# Patient Record
Sex: FEMALE | Race: WHITE | NOT HISPANIC OR LATINO | Employment: STUDENT | URBAN - METROPOLITAN AREA
[De-identification: names, ages, dates, MRNs, and addresses within clinical notes are randomized per-mention and may not be internally consistent; named-entity substitution may affect disease eponyms.]

---
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
## 2023-01-12 DIAGNOSIS — D50.8 OTHER IRON DEFICIENCY ANEMIA: ICD-10-CM

## 2023-01-12 DIAGNOSIS — E03.9 ACQUIRED HYPOTHYROIDISM: ICD-10-CM

## 2023-01-12 RX ORDER — FERROUS SULFATE TAB EC 324 MG (65 MG FE EQUIVALENT) 324 (65 FE) MG
TABLET DELAYED RESPONSE ORAL
Qty: 60 TABLET | Refills: 2 | Status: SHIPPED | OUTPATIENT
Start: 2023-01-12

## 2023-01-12 RX ORDER — LEVOTHYROXINE SODIUM 0.05 MG/1
50 TABLET ORAL
Qty: 30 TABLET | Refills: 2 | Status: SHIPPED | OUTPATIENT
Start: 2023-01-12

## 2023-01-12 NOTE — TELEPHONE ENCOUNTER
Pharmacy requesting refill on ferrous sulfate and levothyroxine   Last seen 8/12/22  Medications sent to pharmacy

## 2023-03-22 ENCOUNTER — OFFICE VISIT (OUTPATIENT)
Dept: OBGYN CLINIC | Facility: CLINIC | Age: 18
End: 2023-03-22

## 2023-03-22 VITALS
DIASTOLIC BLOOD PRESSURE: 80 MMHG | WEIGHT: 201.8 LBS | SYSTOLIC BLOOD PRESSURE: 120 MMHG | BODY MASS INDEX: 35.75 KG/M2 | HEIGHT: 63 IN

## 2023-03-22 DIAGNOSIS — N93.9 ABNORMAL UTERINE BLEEDING (AUB): ICD-10-CM

## 2023-03-22 DIAGNOSIS — D50.8 OTHER IRON DEFICIENCY ANEMIA: ICD-10-CM

## 2023-03-22 DIAGNOSIS — E03.9 HYPOTHYROIDISM (ACQUIRED): ICD-10-CM

## 2023-03-22 DIAGNOSIS — N92.1 MENORRHAGIA WITH IRREGULAR CYCLE: Primary | ICD-10-CM

## 2023-03-22 DIAGNOSIS — L68.0 HIRSUTISM: ICD-10-CM

## 2023-03-22 LAB — SL AMB POCT URINE HCG: NORMAL

## 2023-03-22 RX ORDER — NORGESTIMATE AND ETHINYL ESTRADIOL 7DAYSX3 LO
1 KIT ORAL DAILY
Qty: 84 TABLET | Refills: 1 | Status: SHIPPED | OUTPATIENT
Start: 2023-03-22

## 2023-03-22 RX ORDER — MEDROXYPROGESTERONE ACETATE 10 MG/1
10 TABLET ORAL DAILY
Qty: 10 TABLET | Refills: 0 | Status: SHIPPED | OUTPATIENT
Start: 2023-03-22

## 2023-03-22 NOTE — PATIENT INSTRUCTIONS
Have lab work drawn  FASTING  Schedule pelvic ultrasound and have it done  Take 10 days of medroxyprogesterone-- one daily  After finishing medroxyprogesterone, start birth control pill  One every day:   Take it the same time daily, within the same hour time frame (such as between 8 and 9am)  It common to experience some irregular bleeding when newly starting the pill  This should resolve after 3 months of use  Also minor side effects such as breast tenderness, minor headaches and nausea may occur, but typically resolve after continuing on the pill for at least 3 months  Call if you experience severe headaches, visual disturbances, chest pain, shortness of breath, abdominal pain, pain tingling or weakness in arms or legs  Return visit in 4 months for pill & blood pressure check  Polycystic Ovarian Syndrome   AMBULATORY CARE:   Polycystic ovarian syndrome (PCOS)  is a group of symptoms caused by a hormone disorder  Your body produces too many hormones and your ovaries do not work correctly  Your ovaries have fluid-filled sacs with an immature egg in each one  These are called follicles  The follicles grow bigger and make your ovaries look like they have cysts in them  PCOS increases your risk for endometrial cancer and infertility  Common signs and symptoms:   Irregular or absent monthly periods    Extreme hair growth on your face, chest, and back    Acne, thinning hair or baldness on your scalp    Weight gain, especially around your abdomen    High blood sugar levels or high blood pressure    Periods of extreme sadness and extreme happiness    Difficulty getting pregnant    Darkening of the skin around your neck creases, groin, and under your breasts    Skin tags in your armpits, or on our neck    Call your doctor or gynecologist if:   Your symptoms get worse  You have questions or concerns about your condition or care      Treatment for PCOS  may include any of the following:  Medicines  may be given to control your blood sugar  You may need medicines to decrease male hormones, and increase female hormones  These medicines may also improve acne, baldness and hair growth you do not want  You may also need medicine to help you ovulate if you want to get pregnant  Lifestyle changes:      Manage other health conditions  PCOS increases your risk for diabetes, heart disease, and high blood pressure  Your healthcare provider may send you to specialists that teach you how to manage these conditions  Maintain a healthy weight  Ask your healthcare provider how much you should weigh  Ask him or her to help you create a weight loss plan if you are overweight  Weight loss can help reduce the symptoms of PCOS  You and your healthcare provider can set manageable weight loss goals  Exercise as directed  Exercise can help keep your blood sugar level steady, lower your risk for heart disease, and help you lose weight  Exercise for at least 150 minutes every week  Spread this amount of exercise over at least 3 days in the week  Do not skip exercise more than 2 days in a row  Include muscle strengthening activities 2 to 3 days each week  Examples of exercise include walking or swimming  Do not sit for longer than 30 minutes at a time  Work with your healthcare provider to create an exercise plan that is right for you  Eat a variety of healthy foods  Healthy foods include fruits, vegetables, whole-grain breads, low-fat dairy products, beans, lean meats, and fish  A dietitian may help you plan meals to help manage your other health conditions  Follow up with your doctor or gynecologist as directed: You may need to return for more tests or to check if the treatment is working  Write down your questions so you remember to ask them during your visits    © Copyright [employer] 2022 Information is for End User's use only and may not be sold, redistributed or otherwise used for commercial purposes  The above information is an  only  It is not intended as medical advice for individual conditions or treatments  Talk to your doctor, nurse or pharmacist before following any medical regimen to see if it is safe and effective for you

## 2023-03-22 NOTE — PROGRESS NOTES
Assessment/Plan:    1  Menorrhagia with irregular cycle  UPT negative  Discussed nature of her irregular bleeding patterns and my suspicion for PCOS  Reviewed rationale for lab work and US for support of the diagnosis  Discussed necessity to protect her endometrial lining due to risk of endometrial hyperplasia, atypia or malignancy  - DHEA-sulfate; Future  - Estradiol; Future  - Follicle stimulating hormone; Future  - Insulin, fasting; Future  - Comprehensive metabolic panel; Future  - Luteinizing hormone; Future  - Prolactin; Future  - Testosterone, free, total; Future  - Lipid panel; Future  - TSH, 3rd generation with Free T4 reflex; Future  - CBC and differential; Future  - US pelvis complete w transvaginal; Future  - 17-Hydroxyprogesterone; Future  - medroxyPROGESTERone (PROVERA) 10 mg tablet; Take 1 tablet (10 mg total) by mouth daily  Dispense: 10 tablet; Refill: 0  - norgestimate-ethinyl estradiol (Ortho Tri-Cyclen Lo) 0 18/0 215/0 25 MG-25 MCG per tablet; Take 1 tablet by mouth daily  Dispense: 84 tablet; Refill: 1    2  Abnormal uterine bleeding (AUB)    - POCT urine HCG    3  Hirsutism      4  Hypothyroidism (acquired)  She is taking levothyroxine  Advised to have labs updated -- TFTs ordered, and to f/u with PCP  5  Other iron deficiency anemia  Symptomatic  Advised to resume iron supplement and to f/u with PCP  Clinically suspicious for PCOS  Discussed nature of PCOS and its implications both for her present health and future health  Emphasized need to protect uterine lining  Full endocrine panel ordered  Pelvic US ordered  To start 10 day course of provera, then to start OCP  Explained that pill will regulate, lighten and shorten bleeding and improve her anemia  Detailed instructions reviewed for initiating OCP  RV 4 months for recheck and to discuss all testing      I have spent a total time of 40 minutes on 03/23/23 in caring for this patient including Risks and benefits of tx options, Instructions for management, Patient and family education, Risk factor reductions, Impressions, Counseling / Coordination of care, Documenting in the medical record, Reviewing / ordering tests, medicine, procedures   and Obtaining or reviewing history    Subjective:      Patient ID: Marisa Arizmendi is a 21 y o  female  HPI  NEW PATIENT PROBLEM  CC: abnormal menstrual bleeding    20 yo G0  Presents accompanied by her mother for help with history  Zhang Howe has been bleeding for over a month  Her menstrual patterns have been irregular since menarche which started at age 13  Around 2019 her period intervals were about Q 2-3 months, duration of bleedingx 14-30 days, amount varies, at heaviest experiences gushes during which she has to use a couple pads; when not gushing has to change pad every 3-4 hours  About 2w are at this level of heaviness  Complicated by history of iron deficiency anemia  She was advised to take daily iron however she is no longer taking it  She was diagnosed with hypothyroidism in 8/2022 with a TSH of 40  She was prescribed levothyroxine 50 mcg daily by her PCP and was to have labs repeated 6 weeks later-- labs were not completed  She states that she is still taking the levothyroxine  Her GYN hx is otherwise unremarkable  She has never been sexually active  Pmhx of iron def anemia; hypothyroidism; BMI 36   8/2022 TSH 40, hgb 10 9 and hct 37 6; iron studies sat low at 7; iron low at 26  ROS is significant for symptoms of anemia: fatigue, lightheadedness, headache  Habitus, hirsutism and acne supportive of possible PCOS  Past Medical History:   Diagnosis Date   • Anemia    • BMI 35 0-35 9,adult    • Hypothyroidism      History reviewed  No pertinent surgical history    Family History   Problem Relation Age of Onset   • Hypertension Maternal Grandmother    • Breast cancer Neg Hx    • Colon cancer Neg Hx    • Ovarian cancer Neg Hx    • Uterine cancer Neg Hx Current Outpatient Medications on File Prior to Visit   Medication Sig Dispense Refill   • ferrous sulfate 324 (65 Fe) mg TAKE 1 TABLET BY MOUTH 2 TIMES A DAY BEFORE MEALS 60 tablet 2   • levothyroxine 50 mcg tablet TAKE 1 TABLET (50 MCG TOTAL) BY MOUTH DAILY IN THE EARLY MORNING 30 tablet 2     No current facility-administered medications on file prior to visit  No substance use    The following portions of the patient's history were reviewed and updated as appropriate: allergies, current medications, past family history, past medical history, past social history, past surgical history and problem list     Review of Systems   Constitutional: Positive for fatigue  Negative for chills and fever  Respiratory: Positive for shortness of breath  Negative for cough  Cardiovascular: Negative for chest pain and palpitations  Gastrointestinal: Positive for constipation (a bit, takes iron)  Negative for diarrhea, nausea and vomiting  Genitourinary: Negative for difficulty urinating, dyspareunia, dysuria, frequency, genital sores, hematuria, menstrual problem, pelvic pain, urgency, vaginal bleeding, vaginal discharge and vaginal pain  Musculoskeletal: Negative for arthralgias and myalgias  Neurological: Positive for dizziness, weakness, light-headedness and headaches  Negative for syncope  Psychiatric/Behavioral: Positive for sleep disturbance  The patient is nervous/anxious  Objective:    /80 (BP Location: Right arm, Patient Position: Sitting, Cuff Size: Adult)   Ht 5' 2 5" (1 588 m)   Wt 91 5 kg (201 lb 12 8 oz)   LMP  (LMP Unknown)   BMI 36 32 kg/m²      Physical Exam  Constitutional:       General: She is not in acute distress  Appearance: Normal appearance  She is well-developed  She is not ill-appearing or diaphoretic  Comments: bmi 36 3   HENT:      Head: Normocephalic and atraumatic  Eyes:      Pupils: Pupils are equal, round, and reactive to light     Neck: Thyroid: No thyromegaly  Pulmonary:      Effort: Pulmonary effort is normal    Abdominal:      General: Abdomen is flat  Palpations: Abdomen is soft  Genitourinary:     General: Normal vulva  Exam position: Lithotomy position  Labia:         Right: No rash, tenderness, lesion or injury  Left: No rash, tenderness, lesion or injury  Vagina: No signs of injury and foreign body  No vaginal discharge, erythema, tenderness or bleeding  Cervix: No cervical motion tenderness, discharge or friability  Uterus: Not enlarged and not tender  Adnexa:         Right: No mass or tenderness  Left: No mass or tenderness  Skin:     General: Skin is warm and dry  Comments: Male pattern hair growth legs, buttocks, escutcheon   Acne- facial  Acanthosis nigrans upper inner thighs   Neurological:      General: No focal deficit present  Mental Status: She is alert and oriented to person, place, and time  Psychiatric:         Mood and Affect: Mood normal          Behavior: Behavior normal          Thought Content:  Thought content normal          Judgment: Judgment normal

## 2023-03-30 ENCOUNTER — IMMUNIZATIONS (OUTPATIENT)
Dept: FAMILY MEDICINE CLINIC | Facility: CLINIC | Age: 18
End: 2023-03-30

## 2023-03-30 DIAGNOSIS — Z23 NEED FOR COVID-19 VACCINE: Primary | ICD-10-CM

## 2023-04-01 ENCOUNTER — LAB (OUTPATIENT)
Dept: LAB | Age: 18
End: 2023-04-01

## 2023-04-01 DIAGNOSIS — N92.1 MENORRHAGIA WITH IRREGULAR CYCLE: ICD-10-CM

## 2023-04-01 LAB
ALBUMIN SERPL BCP-MCNC: 3.7 G/DL (ref 3.5–5)
ALP SERPL-CCNC: 77 U/L (ref 46–384)
ALT SERPL W P-5'-P-CCNC: 33 U/L (ref 12–78)
ANION GAP SERPL CALCULATED.3IONS-SCNC: 3 MMOL/L (ref 4–13)
AST SERPL W P-5'-P-CCNC: 23 U/L (ref 5–45)
BASOPHILS # BLD AUTO: 0.04 THOUSANDS/ÂΜL (ref 0–0.1)
BASOPHILS NFR BLD AUTO: 1 % (ref 0–1)
BILIRUB SERPL-MCNC: 0.36 MG/DL (ref 0.2–1)
BUN SERPL-MCNC: 8 MG/DL (ref 5–25)
CALCIUM SERPL-MCNC: 9 MG/DL (ref 8.3–10.1)
CHLORIDE SERPL-SCNC: 106 MMOL/L (ref 96–108)
CHOLEST SERPL-MCNC: 163 MG/DL
CO2 SERPL-SCNC: 25 MMOL/L (ref 21–32)
CREAT SERPL-MCNC: 0.86 MG/DL (ref 0.6–1.3)
EOSINOPHIL # BLD AUTO: 0.11 THOUSAND/ÂΜL (ref 0–0.61)
EOSINOPHIL NFR BLD AUTO: 1 % (ref 0–6)
ERYTHROCYTE [DISTWIDTH] IN BLOOD BY AUTOMATED COUNT: 15.5 % (ref 11.6–15.1)
ESTRADIOL SERPL-MCNC: 60 PG/ML
FSH SERPL-ACNC: 3.3 MIU/ML
GFR SERPL CREATININE-BSD FRML MDRD: 98 ML/MIN/1.73SQ M
GLUCOSE P FAST SERPL-MCNC: 84 MG/DL (ref 65–99)
HCT VFR BLD AUTO: 30.4 % (ref 34.8–46.1)
HDLC SERPL-MCNC: 44 MG/DL
HGB BLD-MCNC: 9.1 G/DL (ref 11.5–15.4)
IMM GRANULOCYTES # BLD AUTO: 0.03 THOUSAND/UL (ref 0–0.2)
IMM GRANULOCYTES NFR BLD AUTO: 0 % (ref 0–2)
INSULIN SERPL-ACNC: 32.6 MU/L (ref 3–25)
LDLC SERPL CALC-MCNC: 95 MG/DL (ref 0–100)
LH SERPL-ACNC: 4.8 MIU/ML
LYMPHOCYTES # BLD AUTO: 2.22 THOUSANDS/ÂΜL (ref 0.6–4.47)
LYMPHOCYTES NFR BLD AUTO: 27 % (ref 14–44)
MCH RBC QN AUTO: 21.5 PG (ref 26.8–34.3)
MCHC RBC AUTO-ENTMCNC: 29.9 G/DL (ref 31.4–37.4)
MCV RBC AUTO: 72 FL (ref 82–98)
MONOCYTES # BLD AUTO: 0.71 THOUSAND/ÂΜL (ref 0.17–1.22)
MONOCYTES NFR BLD AUTO: 9 % (ref 4–12)
NEUTROPHILS # BLD AUTO: 5.02 THOUSANDS/ÂΜL (ref 1.85–7.62)
NEUTS SEG NFR BLD AUTO: 62 % (ref 43–75)
NONHDLC SERPL-MCNC: 119 MG/DL
NRBC BLD AUTO-RTO: 0 /100 WBCS
PLATELET # BLD AUTO: 436 THOUSANDS/UL (ref 149–390)
PMV BLD AUTO: 10.2 FL (ref 8.9–12.7)
POTASSIUM SERPL-SCNC: 3.7 MMOL/L (ref 3.5–5.3)
PROLACTIN SERPL-MCNC: 17.6 NG/ML
PROT SERPL-MCNC: 8.1 G/DL (ref 6.4–8.4)
RBC # BLD AUTO: 4.23 MILLION/UL (ref 3.81–5.12)
SODIUM SERPL-SCNC: 134 MMOL/L (ref 135–147)
T4 FREE SERPL-MCNC: 0.91 NG/DL (ref 0.78–1.33)
TRIGL SERPL-MCNC: 122 MG/DL
TSH SERPL DL<=0.05 MIU/L-ACNC: 13.8 UIU/ML (ref 0.45–4.5)
WBC # BLD AUTO: 8.13 THOUSAND/UL (ref 4.31–10.16)

## 2023-04-02 LAB — DHEA-S SERPL-MCNC: 158 UG/DL (ref 110–433.2)

## 2023-04-03 LAB
TESTOST FREE SERPL-MCNC: 1.4 PG/ML
TESTOST SERPL-MCNC: 45 NG/DL (ref 13–71)

## 2023-04-03 NOTE — RESULT ENCOUNTER NOTE
Martín Casas  [facility] patient  See elevated insulin, elevated TSH (although improved), decreased HDL, Hgb 9 1 and Hct 30 4%  I am working on regulating her cycles to help improve the anemia and I had advised her to resume her iron supplement you ordered  Not sure if you would follow up on all the above, or refer to endo? Thanks!

## 2023-04-04 DIAGNOSIS — D50.8 OTHER IRON DEFICIENCY ANEMIA: ICD-10-CM

## 2023-04-04 DIAGNOSIS — E03.9 ACQUIRED HYPOTHYROIDISM: Primary | ICD-10-CM

## 2023-04-04 RX ORDER — LEVOTHYROXINE SODIUM 0.07 MG/1
75 TABLET ORAL
Qty: 90 TABLET | Refills: 1 | Status: SHIPPED | OUTPATIENT
Start: 2023-04-04 | End: 2023-06-01 | Stop reason: SDUPTHER

## 2023-04-06 DIAGNOSIS — E03.9 ACQUIRED HYPOTHYROIDISM: Primary | ICD-10-CM

## 2023-04-06 DIAGNOSIS — N93.9 ABNORMAL UTERINE BLEEDING (AUB): ICD-10-CM

## 2023-04-07 ENCOUNTER — TELEPHONE (OUTPATIENT)
Dept: FAMILY MEDICINE CLINIC | Facility: CLINIC | Age: 18
End: 2023-04-07

## 2023-04-07 ENCOUNTER — TELEPHONE (OUTPATIENT)
Dept: ENDOCRINOLOGY | Facility: CLINIC | Age: 18
End: 2023-04-07

## 2023-04-07 NOTE — TELEPHONE ENCOUNTER
Pt mom called and was given results   Will call to schedule after she has the bloodwork redrawn in 6-8 weeks

## 2023-04-07 NOTE — TELEPHONE ENCOUNTER
Received referral for [facility]  Called for patient to contact office to schedule Consult/New Patient Appointment  No voicemail box set-up

## 2023-04-19 PROBLEM — E28.2 PCOS (POLYCYSTIC OVARIAN SYNDROME): Status: ACTIVE | Noted: 2023-04-19

## 2023-04-26 ENCOUNTER — CONSULT (OUTPATIENT)
Dept: ENDOCRINOLOGY | Facility: CLINIC | Age: 18
End: 2023-04-26

## 2023-04-26 VITALS
HEART RATE: 104 BPM | SYSTOLIC BLOOD PRESSURE: 120 MMHG | HEIGHT: 63 IN | DIASTOLIC BLOOD PRESSURE: 80 MMHG | WEIGHT: 200.38 LBS | BODY MASS INDEX: 35.5 KG/M2

## 2023-04-26 DIAGNOSIS — E03.9 ACQUIRED HYPOTHYROIDISM: ICD-10-CM

## 2023-04-26 DIAGNOSIS — N93.9 ABNORMAL UTERINE BLEEDING (AUB): ICD-10-CM

## 2023-04-26 NOTE — PROGRESS NOTES
Ly Head 21 y o  female MRN: [id number]    Encounter: [id number]      New pt progress note    Impression:  PCOS  Primary Hypothyroidism    Assessment: This is a 21y o -year-old female with pmhx of PCOS, Hashimoto's disease  Presents to the clinic for a consult on Hashimoto's disease  Primary Hypothyroidism  TSH: 13   T4: 0 9  Current regimen: Euthyrox 75 mcg qd  Pt admits that has not been compliant with the medication, pt reports that during the last month she took the medication 2-3 days per week  We recommended to the patient to be compliant with home regimen and to take the medication early in the AM with an empty stomach or to take medication at night without any food if it is easier for her  I recommended to the patient to obtain a thyroid panel in 6-8 weeks to adjust dose if needed  Hx PCOS:  Pt reports that was recently diagnosed with PCOS and is currently following with her PCP  And is taking OCPs    CC: Primary Hypothyroidism      HPI:  22 yo F with pmhx of PCOS, Primary Hypothyroidism  Presents to the clinic for a consult on Hashimoto's disease  Pt reports that was diagnosed in 2018 and since then she has been on medication however she admits that has not been very compliant  Pt currently reports fatigue, insomnia, weight gain, hair loss  Pt denies neck enlargement, SOB, Dysphagia  Pt reports family hx of Hypothyroidism ( aunts in maternal side) and denies hx of radiation therapy and does not take over the counter supplements  Review of Systems   Constitutional: Positive for fatigue  Negative for activity change and appetite change  HENT: Negative for congestion and facial swelling  Eyes: Negative for photophobia and discharge  Respiratory: Negative for apnea, shortness of breath and wheezing  Cardiovascular: Negative for chest pain and palpitations  Gastrointestinal: Negative for abdominal distention and abdominal pain     Endocrine: Negative for cold intolerance, heat "intolerance and polydipsia  Musculoskeletal: Negative for arthralgias and back pain  Skin: Negative for color change and wound  Neurological: Negative for tremors, weakness and headaches  Psychiatric/Behavioral: Negative for agitation, behavioral problems and confusion  Historical Information   Past Medical History:   Diagnosis Date   • Anemia    • BMI 35 0-35 9,adult    • Hypothyroidism      Past Surgical History:   Procedure Laterality Date   • NO PAST SURGERIES       Social History   Social History     Substance and Sexual Activity   Alcohol Use No     Social History     Substance and Sexual Activity   Drug Use No     Social History     Tobacco Use   Smoking Status Never   Smokeless Tobacco Never     Family History:   Family History   Problem Relation Age of Onset   • Diabetes Mother    • No Known Problems Father    • Hypertension Maternal Grandmother    • Breast cancer Neg Hx    • Colon cancer Neg Hx    • Ovarian cancer Neg Hx    • Uterine cancer Neg Hx        Meds/Allergies   Current Outpatient Medications   Medication Sig Dispense Refill   • ferrous sulfate 324 (65 Fe) mg Take 1 tablet (324 mg total) by mouth 2 (two) times a day before meals 180 tablet 1   • levothyroxine (Euthyrox) 75 mcg tablet Take 1 tablet (75 mcg total) by mouth daily in the early morning 90 tablet 1   • norgestimate-ethinyl estradiol (Ortho Tri-Cyclen Lo) 0 18/0 215/0 25 MG-25 MCG per tablet Take 1 tablet by mouth daily 84 tablet 1   • medroxyPROGESTERone (PROVERA) 10 mg tablet Take 1 tablet (10 mg total) by mouth daily (Patient not taking: Reported on 4/19/2023) 10 tablet 0     No current facility-administered medications for this visit  Allergies   Allergen Reactions   • No Active Allergies        Objective   Vitals: Blood pressure 120/80, pulse 104, height 5' 2 5\" (1 588 m), weight 90 9 kg (200 lb 6 oz)  Physical Exam  Constitutional:       Appearance: Normal appearance  She is obese     HENT:      Head:      " "Comments: + acne     Nose: No congestion or rhinorrhea  Mouth/Throat:      Pharynx: No oropharyngeal exudate or posterior oropharyngeal erythema  Eyes:      Conjunctiva/sclera: Conjunctivae normal       Pupils: Pupils are equal, round, and reactive to light  Cardiovascular:      Rate and Rhythm: Normal rate and regular rhythm  Pulses: Normal pulses  Heart sounds: No murmur heard  No friction rub  Pulmonary:      Effort: No respiratory distress  Breath sounds: No stridor  No wheezing  Abdominal:      General: There is no distension  Palpations: Abdomen is soft  Tenderness: There is no abdominal tenderness  Musculoskeletal:         General: No swelling  Cervical back: No rigidity or tenderness  Skin:     Coloration: Skin is not jaundiced  Findings: No bruising  Neurological:      General: No focal deficit present  Mental Status: She is alert and oriented to person, place, and time  Motor: No weakness  Psychiatric:         Mood and Affect: Mood normal          Thought Content: Thought content normal          Judgment: Judgment normal            Lab Results:   Lab Results   Component Value Date/Time    TSH 3RD GENERATON 13 800 (H) 04/01/2023 11:52 AM    TSH 3RD GENERATON 40 100 (H) 08/15/2022 11:01 AM    Free T4 0 91 04/01/2023 11:52 AM    Free T4 0 87 08/15/2022 11:01 AM         Portions of the record may have been created with voice recognition software  Occasional wrong word or \"sound a like\" substitutions may have occurred due to the inherent limitations of voice recognition software  Read the chart carefully and recognize, using context, where substitutions have occurred    "

## 2023-06-01 DIAGNOSIS — E03.9 ACQUIRED HYPOTHYROIDISM: ICD-10-CM

## 2023-06-01 RX ORDER — LEVOTHYROXINE SODIUM 0.07 MG/1
75 TABLET ORAL
Qty: 90 TABLET | Refills: 1 | Status: SHIPPED | OUTPATIENT
Start: 2023-06-01

## 2023-06-01 NOTE — TELEPHONE ENCOUNTER
PT mother called pharmacy for refill and they stated they needed a new script and she is out of the medication

## 2023-06-12 ENCOUNTER — LAB (OUTPATIENT)
Dept: LAB | Age: 18
End: 2023-06-12
Payer: COMMERCIAL

## 2023-06-12 DIAGNOSIS — E03.9 ACQUIRED HYPOTHYROIDISM: ICD-10-CM

## 2023-06-12 LAB
T4 FREE SERPL-MCNC: 0.85 NG/DL (ref 0.61–1.12)
TSH SERPL DL<=0.05 MIU/L-ACNC: 16.45 UIU/ML (ref 0.45–4.5)

## 2023-06-12 PROCEDURE — 84439 ASSAY OF FREE THYROXINE: CPT

## 2023-06-12 PROCEDURE — 36415 COLL VENOUS BLD VENIPUNCTURE: CPT

## 2023-06-12 PROCEDURE — 84443 ASSAY THYROID STIM HORMONE: CPT

## 2023-06-13 DIAGNOSIS — E03.9 ACQUIRED HYPOTHYROIDISM: Primary | ICD-10-CM

## 2023-06-13 RX ORDER — LEVOTHYROXINE SODIUM 88 UG/1
TABLET ORAL
Qty: 30 TABLET | Refills: 3 | Status: SHIPPED | OUTPATIENT
Start: 2023-06-13

## 2023-06-13 NOTE — RESULT ENCOUNTER NOTE
Please let her know that her TSH result is back and similar to the last time  Please have her increase the levothyroxine to 88mcg once daily and get her TSH again in 6-8 weeks  I will order these   Thank you

## 2023-09-03 DIAGNOSIS — N92.1 MENORRHAGIA WITH IRREGULAR CYCLE: ICD-10-CM

## 2023-09-05 RX ORDER — NORGESTIMATE AND ETHINYL ESTRADIOL
1 KIT DAILY
Qty: 28 TABLET | Refills: 0 | Status: SHIPPED | OUTPATIENT
Start: 2023-09-05 | End: 2023-09-21 | Stop reason: SDUPTHER

## 2023-09-05 NOTE — TELEPHONE ENCOUNTER
Patient overdue for her follow up. One month of OCP refilled, as she has an appt scheduled with me on 9/21/23.

## 2023-09-21 ENCOUNTER — OFFICE VISIT (OUTPATIENT)
Dept: OBGYN CLINIC | Facility: CLINIC | Age: 18
End: 2023-09-21
Payer: COMMERCIAL

## 2023-09-21 VITALS
HEIGHT: 62 IN | SYSTOLIC BLOOD PRESSURE: 124 MMHG | BODY MASS INDEX: 36.44 KG/M2 | WEIGHT: 198 LBS | DIASTOLIC BLOOD PRESSURE: 76 MMHG

## 2023-09-21 DIAGNOSIS — E03.9 HYPOTHYROIDISM (ACQUIRED): ICD-10-CM

## 2023-09-21 DIAGNOSIS — N92.1 MENORRHAGIA WITH IRREGULAR CYCLE: ICD-10-CM

## 2023-09-21 DIAGNOSIS — E28.2 PCOS (POLYCYSTIC OVARIAN SYNDROME): Primary | ICD-10-CM

## 2023-09-21 PROCEDURE — 99214 OFFICE O/P EST MOD 30 MIN: CPT | Performed by: NURSE PRACTITIONER

## 2023-09-21 RX ORDER — NORGESTIMATE AND ETHINYL ESTRADIOL 7DAYSX3 LO
1 KIT ORAL DAILY
Qty: 84 TABLET | Refills: 2 | Status: SHIPPED | OUTPATIENT
Start: 2023-09-21

## 2023-09-21 RX ORDER — MEDROXYPROGESTERONE ACETATE 10 MG/1
10 TABLET ORAL DAILY
Qty: 10 TABLET | Refills: 0 | Status: SHIPPED | OUTPATIENT
Start: 2023-09-21

## 2023-09-21 NOTE — PATIENT INSTRUCTIONS
Take one medroxyprogesterone pill daily for ten days. When bleeding starts begin taking birth control pill once daily. Have your thyroid lab work repeated, as ordered by your endocrinologist.    Yoli Milder high fasting insulin with your endocrinologist and treatment options.

## 2023-09-23 ENCOUNTER — APPOINTMENT (OUTPATIENT)
Dept: LAB | Age: 18
End: 2023-09-23
Payer: COMMERCIAL

## 2023-09-23 DIAGNOSIS — D50.8 OTHER IRON DEFICIENCY ANEMIA: ICD-10-CM

## 2023-09-23 DIAGNOSIS — E03.9 ACQUIRED HYPOTHYROIDISM: ICD-10-CM

## 2023-09-23 LAB
BASOPHILS # BLD AUTO: 0.07 THOUSANDS/ÂΜL (ref 0–0.1)
BASOPHILS NFR BLD AUTO: 1 % (ref 0–1)
EOSINOPHIL # BLD AUTO: 0.07 THOUSAND/ÂΜL (ref 0–0.61)
EOSINOPHIL NFR BLD AUTO: 1 % (ref 0–6)
ERYTHROCYTE [DISTWIDTH] IN BLOOD BY AUTOMATED COUNT: 18.3 % (ref 11.6–15.1)
FERRITIN SERPL-MCNC: 20 NG/ML (ref 11–307)
HCT VFR BLD AUTO: 37.2 % (ref 34.8–46.1)
HGB BLD-MCNC: 10.8 G/DL (ref 11.5–15.4)
IMM GRANULOCYTES # BLD AUTO: 0.05 THOUSAND/UL (ref 0–0.2)
IMM GRANULOCYTES NFR BLD AUTO: 0 % (ref 0–2)
IRON SATN MFR SERPL: 6 % (ref 15–50)
IRON SERPL-MCNC: 23 UG/DL (ref 50–212)
LYMPHOCYTES # BLD AUTO: 3.48 THOUSANDS/ÂΜL (ref 0.6–4.47)
LYMPHOCYTES NFR BLD AUTO: 26 % (ref 14–44)
MCH RBC QN AUTO: 19.9 PG (ref 26.8–34.3)
MCHC RBC AUTO-ENTMCNC: 29 G/DL (ref 31.4–37.4)
MCV RBC AUTO: 68 FL (ref 82–98)
MONOCYTES # BLD AUTO: 0.89 THOUSAND/ÂΜL (ref 0.17–1.22)
MONOCYTES NFR BLD AUTO: 7 % (ref 4–12)
NEUTROPHILS # BLD AUTO: 9.02 THOUSANDS/ÂΜL (ref 1.85–7.62)
NEUTS SEG NFR BLD AUTO: 65 % (ref 43–75)
NRBC BLD AUTO-RTO: 0 /100 WBCS
PLATELET # BLD AUTO: 336 THOUSANDS/UL (ref 149–390)
PMV BLD AUTO: 10.3 FL (ref 8.9–12.7)
RBC # BLD AUTO: 5.44 MILLION/UL (ref 3.81–5.12)
T4 FREE SERPL-MCNC: 0.57 NG/DL (ref 0.61–1.12)
TIBC SERPL-MCNC: 376 UG/DL (ref 250–450)
TSH SERPL DL<=0.05 MIU/L-ACNC: 19.27 UIU/ML (ref 0.45–4.5)
UIBC SERPL-MCNC: 353 UG/DL (ref 155–355)
WBC # BLD AUTO: 13.58 THOUSAND/UL (ref 4.31–10.16)

## 2023-09-23 PROCEDURE — 84443 ASSAY THYROID STIM HORMONE: CPT

## 2023-09-23 PROCEDURE — 83550 IRON BINDING TEST: CPT

## 2023-09-23 PROCEDURE — 36415 COLL VENOUS BLD VENIPUNCTURE: CPT

## 2023-09-23 PROCEDURE — 82728 ASSAY OF FERRITIN: CPT

## 2023-09-23 PROCEDURE — 84439 ASSAY OF FREE THYROXINE: CPT

## 2023-09-23 PROCEDURE — 85025 COMPLETE CBC W/AUTO DIFF WBC: CPT

## 2023-09-23 PROCEDURE — 83540 ASSAY OF IRON: CPT

## 2023-09-28 ENCOUNTER — TELEPHONE (OUTPATIENT)
Dept: FAMILY MEDICINE CLINIC | Facility: CLINIC | Age: 18
End: 2023-09-28

## 2023-09-28 DIAGNOSIS — D50.8 OTHER IRON DEFICIENCY ANEMIA: ICD-10-CM

## 2023-09-28 RX ORDER — FERROUS SULFATE 324(65)MG
324 TABLET, DELAYED RELEASE (ENTERIC COATED) ORAL
Qty: 180 TABLET | Refills: 1 | Status: SHIPPED | OUTPATIENT
Start: 2023-09-28 | End: 2023-12-27

## 2023-09-28 NOTE — TELEPHONE ENCOUNTER
----- Message from [address] sent at 9/28/2023  6:43 AM EDT -----  TSH is high. Recommend following up with endocrinologist.  Iron studies improved but hemoglobin is still low. Still recommend she take ferrous sulfate twice daily with Vitamin C to increase absorption. I sent script to pharmacy.

## 2023-10-17 DIAGNOSIS — E03.9 ACQUIRED HYPOTHYROIDISM: ICD-10-CM

## 2023-10-17 RX ORDER — LEVOTHYROXINE SODIUM 88 UG/1
TABLET ORAL
Qty: 30 TABLET | Refills: 3 | Status: SHIPPED | OUTPATIENT
Start: 2023-10-17 | End: 2023-10-18

## 2023-10-18 ENCOUNTER — OFFICE VISIT (OUTPATIENT)
Dept: FAMILY MEDICINE CLINIC | Facility: CLINIC | Age: 18
End: 2023-10-18
Payer: COMMERCIAL

## 2023-10-18 VITALS
HEART RATE: 100 BPM | RESPIRATION RATE: 16 BRPM | HEIGHT: 62 IN | SYSTOLIC BLOOD PRESSURE: 120 MMHG | TEMPERATURE: 99.5 F | OXYGEN SATURATION: 99 % | DIASTOLIC BLOOD PRESSURE: 76 MMHG | BODY MASS INDEX: 36.84 KG/M2 | WEIGHT: 200.2 LBS

## 2023-10-18 DIAGNOSIS — Z00.00 HEALTHCARE MAINTENANCE: ICD-10-CM

## 2023-10-18 DIAGNOSIS — F32.A DEPRESSION, UNSPECIFIED DEPRESSION TYPE: ICD-10-CM

## 2023-10-18 DIAGNOSIS — E03.9 ACQUIRED HYPOTHYROIDISM: Primary | ICD-10-CM

## 2023-10-18 DIAGNOSIS — Z23 NEED FOR INFLUENZA VACCINATION: ICD-10-CM

## 2023-10-18 DIAGNOSIS — E28.2 PCOS (POLYCYSTIC OVARIAN SYNDROME): ICD-10-CM

## 2023-10-18 DIAGNOSIS — D50.8 OTHER IRON DEFICIENCY ANEMIA: ICD-10-CM

## 2023-10-18 PROCEDURE — 99214 OFFICE O/P EST MOD 30 MIN: CPT | Performed by: NURSE PRACTITIONER

## 2023-10-18 PROCEDURE — 90471 IMMUNIZATION ADMIN: CPT

## 2023-10-18 PROCEDURE — 90686 IIV4 VACC NO PRSV 0.5 ML IM: CPT

## 2023-10-18 PROCEDURE — 99395 PREV VISIT EST AGE 18-39: CPT | Performed by: NURSE PRACTITIONER

## 2023-10-18 RX ORDER — LEVOTHYROXINE SODIUM 0.1 MG/1
100 TABLET ORAL
Qty: 90 TABLET | Refills: 1 | Status: SHIPPED | OUTPATIENT
Start: 2023-10-18 | End: 2023-10-19

## 2023-10-18 NOTE — ASSESSMENT & PLAN NOTE
- Improving.   - Continue ferrous sulfate with Vitamin C.   - Will continue to monitor CBC and iron panel.

## 2023-10-18 NOTE — ASSESSMENT & PLAN NOTE
- Not well controlled. - She does not wish to start medication at this time. - Referred to Psychiatrist.   - ER for any thoughts of self harm. Patient understands and agrees.

## 2023-10-18 NOTE — ASSESSMENT & PLAN NOTE
- Reviewed immunizations and screenings.   - Recommend regular dental and eye exams.   - Recent lab results reviewed with patient. - Flu shot given in office today.

## 2023-10-18 NOTE — PROGRESS NOTES
Name: Sacha Dawson      : 2005      MRN: [id number]  Encounter Provider: ADRIAN Romo  Encounter Date: 10/18/2023   Encounter department: [facility]     1. Acquired hypothyroidism  Assessment & Plan:  - Not well controlled. - levothyroxine increased to 100 mcg daily. Advised to take in the morning by itself and wait 1 hour to take other medications or eat. - Recheck TSH and free T4 in 6 weeks. - Also advised to follow up with Endocrinologist.    Orders:  -     levothyroxine (Euthyrox) 100 mcg tablet; Take 1 tablet (100 mcg total) by mouth daily in the early morning  -     TSH, 3rd generation with Free T4 reflex; Future    2. Other iron deficiency anemia  Assessment & Plan:  - Improving.   - Continue ferrous sulfate with Vitamin C.   - Will continue to monitor CBC and iron panel. 3. Depression, unspecified depression type  Assessment & Plan:  - Not well controlled. - She does not wish to start medication at this time. - Referred to Psychiatrist.   - ER for any thoughts of self harm. Patient understands and agrees. Orders:  -     Ambulatory referral to [address] [place] [address]; Future    4. PCOS (polycystic ovarian syndrome)  Assessment & Plan:  - Confirmed with pelvic US. - Continue routine follow up with OB/GYN. 5. Healthcare maintenance  Assessment & Plan:  - Reviewed immunizations and screenings.   - Recommend regular dental and eye exams.   - Recent lab results reviewed with patient. - Flu shot given in office today. 6. Need for influenza vaccination  -     FLUZONE: influenza vaccine, quadrivalent, 0.5 mL         Subjective     Patient with PMH of hypothyroidism and anemia presents today for follow up. She had her blood work done which showed elevated TSH of 19.27. She reports that she has been compliant with levothyroxine 88 mcg daily. States she takes it in the morning  by other medications and food.  CBC and iron panel somewhat improved but still significant for iron deficiency anemia. States she is compliant with ferrous sulfate and vitamin C. She is requesting a referral today for therapy or counseling. She has complaints of depression. States she cries a lot and normal things are very overwhelming to her. Feels she can't keep up with her assignments in college because of it. States she dealt with depression in the past which improved after moving to a different state. She does have thoughts of self harm but has no specific suicide plan. She has never been on medication for depression. She denies any other concerns or complaints today. Review of Systems   Constitutional:  Positive for fatigue. Negative for fever. HENT:  Negative for congestion, rhinorrhea and trouble swallowing. Eyes:  Negative for visual disturbance. Respiratory:  Negative for cough and shortness of breath. Cardiovascular:  Negative for chest pain and palpitations. Gastrointestinal:  Negative for abdominal pain and blood in stool. Endocrine: Negative for cold intolerance and heat intolerance. Genitourinary:  Negative for difficulty urinating, dysuria and frequency. Musculoskeletal:  Negative for gait problem. Skin:  Negative for rash. Neurological:  Negative for dizziness, syncope and headaches. Hematological:  Negative for adenopathy. Psychiatric/Behavioral:  Positive for decreased concentration, dysphoric mood and self-injury. Negative for behavioral problems.         Past Medical History:   Diagnosis Date   • Anemia    • BMI 35.0-35.9,adult    • Hypothyroidism      Past Surgical History:   Procedure Laterality Date   • NO PAST SURGERIES       Family History   Problem Relation Age of Onset   • Diabetes Mother    • No Known Problems Father    • Hypertension Maternal Grandmother    • Breast cancer Neg Hx    • Colon cancer Neg Hx    • Ovarian cancer Neg Hx    • Uterine cancer Neg Hx      Social History Socioeconomic History   • Marital status: Single     Spouse name: None   • Number of children: None   • Years of education: None   • Highest education level: None   Occupational History   • None   Tobacco Use   • Smoking status: Never   • Smokeless tobacco: Never   Vaping Use   • Vaping Use: Never used   Substance and Sexual Activity   • Alcohol use: No   • Drug use: No   • Sexual activity: Never   Other Topics Concern   • None   Social History Narrative   • None     Social Determinants of Health     Financial Resource Strain: Not on file   Food Insecurity: Not on file   Transportation Needs: Not on file   Physical Activity: Not on file   Stress: Not on file   Social Connections: Not on file   Intimate Partner Violence: Not on file   Housing Stability: Not on file     Current Outpatient Medications on File Prior to Visit   Medication Sig   • ascorbic acid (VITAMIN C) 500 MG TBCR Take 1 tablet (500 mg total) by mouth daily   • ferrous sulfate 324 (65 Fe) mg Take 1 tablet (324 mg total) by mouth 2 (two) times a day before meals   • medroxyPROGESTERone (PROVERA) 10 mg tablet Take 1 tablet (10 mg total) by mouth daily   • norgestimate-ethinyl estradiol (Tri-Lo-Monique) 0.18/0.215/0.25 MG-25 MCG per tablet Take 1 tablet by mouth daily   • [DISCONTINUED] levothyroxine 88 mcg tablet TAKE 1 TABLET BY MOUTH EVERY DAY     Allergies   Allergen Reactions   • No Active Allergies      Immunization History   Administered Date(s) Administered   • COVID-19 PFIZER VACCINE 0.3 ML IM 08/26/2022   • COVID-19 Pfizer vac (Mario-sucrose, gray cap) 12 yr+ IM 03/30/2023   • HPV 06/23/2017   • HPV9 03/22/2017, 06/23/2017, 11/01/2017   • Hep A, ped/adol, 2 dose 11/01/2017   • Hep B, Adolescent or Pediatric 02/22/2017, 03/22/2017, 06/23/2017   • Hepatitis A 03/22/2017, 11/01/2017   • INFLUENZA 10/12/2018, 01/20/2020   • IPV 02/22/2017, 03/22/2017, 11/01/2017, 01/21/2020   • Influenza, injectable, quadrivalent, preservative free 0.5 mL 01/20/2020, 10/18/2023   • MMR 02/22/2017, 03/22/2017   • MMRV 03/22/2017   • Meningococcal Conjugate (MCV4O) 02/22/2017   • Meningococcal MCV4, Unspecified 02/22/2017, 10/12/2020   • Meningococcal MCV4P 02/22/2017, 10/12/2020   • TD (adult) Preservative Free 03/22/2017   • Td (adult), adsorbed 03/22/2017   • Tdap 02/22/2017, 11/04/2017   • Varicella 02/22/2017, 03/22/2017       Objective     /76 (BP Location: Left arm, Patient Position: Sitting, Cuff Size: Large)   Pulse 100   Temp 99.5 °F (37.5 °C) (Tympanic)   Resp 16   Ht 5' 2" (1.575 m)   Wt 90.8 kg (200 lb 3.2 oz)   LMP  (LMP Unknown) Comment: birth control  SpO2 99%   BMI 36.62 kg/m²     Physical Exam  Vitals and nursing note reviewed. Constitutional:       General: She is not in acute distress. Appearance: Normal appearance. She is not ill-appearing. HENT:      Head: Normocephalic and atraumatic. Right Ear: Tympanic membrane, ear canal and external ear normal.      Left Ear: Tympanic membrane, ear canal and external ear normal.      Nose: Nose normal.      Mouth/Throat:      Mouth: Mucous membranes are moist.      Pharynx: No posterior oropharyngeal erythema. Eyes:      Conjunctiva/sclera: Conjunctivae normal.      Pupils: Pupils are equal, round, and reactive to light. Cardiovascular:      Rate and Rhythm: Normal rate and regular rhythm. Heart sounds: Normal heart sounds. Pulmonary:      Effort: Pulmonary effort is normal.      Breath sounds: Normal breath sounds. Abdominal:      General: Bowel sounds are normal.      Palpations: Abdomen is soft. Musculoskeletal:         General: Normal range of motion. Cervical back: Normal range of motion. Lymphadenopathy:      Cervical: No cervical adenopathy. Skin:     General: Skin is warm and dry. Neurological:      Mental Status: She is alert and oriented to person, place, and time.    Psychiatric:         Mood and Affect: Mood normal.         Behavior: Behavior normal.       Bairon Morganza, ADRIAN

## 2023-10-18 NOTE — ASSESSMENT & PLAN NOTE
- Not well controlled. - levothyroxine increased to 100 mcg daily. Advised to take in the morning by itself and wait 1 hour to take other medications or eat. - Recheck TSH and free T4 in 6 weeks.   - Also advised to follow up with Endocrinologist.

## 2023-10-19 ENCOUNTER — OFFICE VISIT (OUTPATIENT)
Dept: ENDOCRINOLOGY | Facility: CLINIC | Age: 18
End: 2023-10-19
Payer: COMMERCIAL

## 2023-10-19 VITALS
BODY MASS INDEX: 36.66 KG/M2 | SYSTOLIC BLOOD PRESSURE: 124 MMHG | HEIGHT: 62 IN | DIASTOLIC BLOOD PRESSURE: 82 MMHG | WEIGHT: 199.2 LBS

## 2023-10-19 DIAGNOSIS — E28.2 PCOS (POLYCYSTIC OVARIAN SYNDROME): ICD-10-CM

## 2023-10-19 DIAGNOSIS — E03.9 ACQUIRED HYPOTHYROIDISM: Primary | ICD-10-CM

## 2023-10-19 PROCEDURE — 99214 OFFICE O/P EST MOD 30 MIN: CPT | Performed by: INTERNAL MEDICINE

## 2023-10-19 RX ORDER — LEVOTHYROXINE SODIUM 0.12 MG/1
125 TABLET ORAL DAILY
Qty: 30 TABLET | Refills: 4 | Status: SHIPPED | OUTPATIENT
Start: 2023-10-19

## 2023-10-19 NOTE — PATIENT INSTRUCTIONS
Please take the levothyroxine 125mcg once daily and get your blood work again in 6-8 weeks. This does not need to be fasting.

## 2023-10-19 NOTE — PROGRESS NOTES
Chief Complaint   Patient presents with    Hypothyroidism    Polycystic Ovary Syndrome      Referring Provider  Bubba Powell  [address] [facility]     History of Present Illness:   Freddy Castillo is a 21 y.o. female with hypothyroidism seen in follow-up. Last in theoffice in April 2023. She has a hx of hypothyroidism dx in 2018 and recently dx PCOS. She is seeing GYN is on [facility]. Hypothyroidism was dx in 2018. She was given levothyroxine, but has been inconsistent in taking this over the years. Her current symptoms include fatigue, difficulty to lose weight, and hair loss. Her mood has also been decreased and it is impactng her schoolwork. She denies changes in her neck, change in her voice or dysphagia. There are aunts with hypothyroidism. She denies radiation exposure to head/neck/chest.      Her current dose is Euthyrox (levothyroxine) 75mcg daily, which she is taking more consistently. TSH was elevated and her PCP did want he to increase to 100mcg. She ahs not gotten this yet. Also, she was just started on iron supplements and has noted some constipation on iron. She is  this from her levothyroxine.      Patient Active Problem List   Diagnosis    Acquired hypothyroidism    Absolute anemia    Abnormal uterine bleeding (AUB)    Exercise counseling    Vitamin D insufficiency    Other headache syndrome    Neck pain    Nutritional counseling    Arthralgia    Chronic fatigue    Crushing injury of great toe, left, initial encounter    Irregular periods    PCOS (polycystic ovarian syndrome)    Depression    Need for influenza vaccination    Healthcare maintenance      Past Medical History:   Diagnosis Date    Anemia     BMI 35.0-35.9,adult     Hypothyroidism       Past Surgical History:   Procedure Laterality Date    NO PAST SURGERIES        Family History   Problem Relation Age of Onset    Diabetes Mother     No Known Problems Father     Hypertension Maternal Grandmother     Breast cancer Neg Hx     Colon cancer Neg Hx     Ovarian cancer Neg Hx     Uterine cancer Neg Hx      Social History     Tobacco Use    Smoking status: Never    Smokeless tobacco: Never   Substance Use Topics    Alcohol use: No     Allergies   Allergen Reactions    No Active Allergies          Current Outpatient Medications:     ascorbic acid (VITAMIN C) 500 MG TBCR, Take 1 tablet (500 mg total) by mouth daily, Disp: 90 tablet, Rfl: 1    ferrous sulfate 324 (65 Fe) mg, Take 1 tablet (324 mg total) by mouth 2 (two) times a day before meals, Disp: 180 tablet, Rfl: 1    levothyroxine (Levoxyl) 125 mcg tablet, Take 1 tablet (125 mcg total) by mouth daily, Disp: 30 tablet, Rfl: 4    norgestimate-ethinyl estradiol (Tri-Lo-Monique) 0.18/0.215/0.25 MG-25 MCG per tablet, Take 1 tablet by mouth daily, Disp: 84 tablet, Rfl: 2  Review of Systems   Constitutional:  Positive for appetite change (decreased) and fatigue. Negative for unexpected weight change. HENT:  Negative for trouble swallowing and voice change (levothyroxine). Cardiovascular:  Negative for palpitations. Genitourinary:  Negative for menstrual problem. Skin:         See HPI   Neurological:  Negative for tremors and weakness. Psychiatric/Behavioral:  Positive for decreased concentration and sleep disturbance. Physical Exam:  Body mass index is 36.43 kg/m². /82 (BP Location: Right arm, Patient Position: Sitting, Cuff Size: Adult)   Ht 5' 2" (1.575 m)   Wt 90.4 kg (199 lb 3.2 oz)   LMP  (LMP Unknown) Comment: birth control  BMI 36.43 kg/m²    Wt Readings from Last 3 Encounters:   10/19/23 90.4 kg (199 lb 3.2 oz) (97 %, Z= 1.93)*   10/18/23 90.8 kg (200 lb 3.2 oz) (97 %, Z= 1.94)*   09/21/23 89.8 kg (198 lb) (97 %, Z= 1.91)*     * Growth percentiles are based on [facility] (Girls, 2-20 Years) data.        GEN: NAD  E/n/m nl facies, hearing intact bilat, tongue midline, lips nl  Eyes: no stare or proptosis, nl lids and conjunctiva, EOMI  Neck: trachea midline, thyroid NT to palpation, nl in size, no nodules or neck masses noted, no cervical LAD  MS: moves all 4 ext, gait nl  Skin:warm and dry  Neuro: no tremor, 2+ DTRs in BUE  Psych: nl mood and affect, no gross lapses in memory    DATA:  Labs:   Lab Results   Component Value Date    HKV2VPXCWASF 19.271 (H) 09/23/2023         Lab Results   Component Value Date    FREET4 0.57 (L) 09/23/2023         Radiology    Impression:  1. Acquired hypothyroidism    2. PCOS (polycystic ovarian syndrome)           Plan:    Pat Allan was seen today for hypothyroidism and polycystic ovary syndrome. Diagnoses and all orders for this visit:    Acquired hypothyroidism  -     levothyroxine (Levoxyl) 125 mcg tablet; Take 1 tablet (125 mcg total) by mouth daily  -     TSH, 3rd generation Lab Collect; Future    PCOS (polycystic ovarian syndrome)         Hypothyroidism: She has been more consistent with levothyroxine. Will increase to a weight based dose of 125mcg daily. New RX sent. Ordered repeat TSH for 6-8 weeks to reassess dose. PCOS: offered metformin, but she does not want to add medications now. She would consdier this in the future      Discussed with the patient and all questioned fully answered. She will call me if any problems arise.         Joana Vegas MD

## 2023-11-01 ENCOUNTER — TELEPHONE (OUTPATIENT)
Dept: PSYCHIATRY | Facility: CLINIC | Age: 18
End: 2023-11-01

## 2023-11-01 NOTE — TELEPHONE ENCOUNTER
Patient called regarding services. Placed on Epic wait list with preferences below. Sent extra Jj.     Medication Management and Talk Therapy    Ramirez Espinoza, Female provider, [place], and prefers in person    Insurance: [employer]  Type: [address]

## 2023-11-02 ENCOUNTER — TELEPHONE (OUTPATIENT)
Dept: PSYCHIATRY | Facility: CLINIC | Age: 18
End: 2023-11-02

## 2023-12-17 PROBLEM — Z00.00 HEALTHCARE MAINTENANCE: Status: RESOLVED | Noted: 2023-10-18 | Resolved: 2023-12-17

## 2024-01-24 ENCOUNTER — OFFICE VISIT (OUTPATIENT)
Dept: FAMILY MEDICINE CLINIC | Facility: CLINIC | Age: 19
End: 2024-01-24
Payer: COMMERCIAL

## 2024-01-24 ENCOUNTER — APPOINTMENT (OUTPATIENT)
Dept: LAB | Age: 19
End: 2024-01-24
Payer: COMMERCIAL

## 2024-01-24 VITALS
RESPIRATION RATE: 16 BRPM | SYSTOLIC BLOOD PRESSURE: 124 MMHG | BODY MASS INDEX: 35.63 KG/M2 | DIASTOLIC BLOOD PRESSURE: 78 MMHG | WEIGHT: 193.6 LBS | HEIGHT: 62 IN | TEMPERATURE: 99.3 F | OXYGEN SATURATION: 99 % | HEART RATE: 98 BPM

## 2024-01-24 DIAGNOSIS — F41.9 ANXIETY: ICD-10-CM

## 2024-01-24 DIAGNOSIS — E03.9 ACQUIRED HYPOTHYROIDISM: Primary | ICD-10-CM

## 2024-01-24 DIAGNOSIS — E03.9 ACQUIRED HYPOTHYROIDISM: ICD-10-CM

## 2024-01-24 DIAGNOSIS — F32.A DEPRESSION, UNSPECIFIED DEPRESSION TYPE: ICD-10-CM

## 2024-01-24 LAB
T4 FREE SERPL-MCNC: 0.78 NG/DL (ref 0.61–1.12)
TSH SERPL DL<=0.05 MIU/L-ACNC: 6.96 UIU/ML (ref 0.45–4.5)

## 2024-01-24 PROCEDURE — 84443 ASSAY THYROID STIM HORMONE: CPT

## 2024-01-24 PROCEDURE — 84439 ASSAY OF FREE THYROXINE: CPT

## 2024-01-24 PROCEDURE — 36415 COLL VENOUS BLD VENIPUNCTURE: CPT

## 2024-01-24 PROCEDURE — 99214 OFFICE O/P EST MOD 30 MIN: CPT | Performed by: NURSE PRACTITIONER

## 2024-01-24 RX ORDER — HYDROXYZINE PAMOATE 25 MG/1
25 CAPSULE ORAL 3 TIMES DAILY PRN
Qty: 30 CAPSULE | Refills: 3 | Status: SHIPPED | OUTPATIENT
Start: 2024-01-24

## 2024-01-24 RX ORDER — ESCITALOPRAM OXALATE 10 MG/1
10 TABLET ORAL DAILY
Qty: 30 TABLET | Refills: 1 | Status: SHIPPED | OUTPATIENT
Start: 2024-01-24

## 2024-01-24 NOTE — PROGRESS NOTES
Name: Marquita Haynes      : 2005      MRN: [id number]  Encounter Provider: ADRIAN Beckford  Encounter Date: 2024   Encounter department: [facility]    Assessment & Plan     1. Acquired hypothyroidism  Assessment & Plan:  - Continue levothyroxine 125 mcg daily.   - Due for updated TSH and free T4.   - Continue routine follow up with Endocrinologist.       2. Anxiety  Assessment & Plan:  - Not well controlled.  - Prescription sent for Lexapro 10 mg daily and hydroxyzine TID PRN. Advised of side effects.  - She is on wait list for talk therapy.   - Recommend follow up in 4-6 weeks.     Orders:  -     escitalopram (Lexapro) 10 mg tablet; Take 1 tablet (10 mg total) by mouth daily  -     hydrOXYzine pamoate (VISTARIL) 25 mg capsule; Take 1 capsule (25 mg total) by mouth 3 (three) times a day as needed for anxiety    3. Depression, unspecified depression type  Assessment & Plan:  - Not well controlled.  - Prescription sent for Lexapro 10 mg daily. Advised of side effects.  - She is on wait list for talk therapy.   - Recommend follow up in 4-6 weeks.     Orders:  -     escitalopram (Lexapro) 10 mg tablet; Take 1 tablet (10 mg total) by mouth daily         Subjective     Patient with PMH of hypothyroidism, anxiety, and depression presents to office today for follow up. She is taking her prescribed medication and reports no side effects. She was due for repeat TSH after her levothyroxine was adjusted in October. She has yet to complete. She is having increased anxiety and depression symptoms. She declined medication last visit. She was referred to Psych and is on a wait list. She is now interested in medication to help her symptoms. States she has increased anxiety especially before going to sleep. Denies any SI or HI. Denies any other concerns or complaints today.        Review of Systems   Constitutional:  Negative for fatigue and fever.   HENT:  Negative for trouble  swallowing.    Eyes:  Negative for visual disturbance.   Respiratory:  Negative for cough and shortness of breath.    Cardiovascular:  Negative for chest pain and palpitations.   Gastrointestinal:  Negative for abdominal pain and blood in stool.   Endocrine: Negative for cold intolerance and heat intolerance.   Genitourinary:  Negative for difficulty urinating and dysuria.   Musculoskeletal:  Negative for gait problem.   Skin:  Negative for rash.   Neurological:  Negative for dizziness, syncope and headaches.   Hematological:  Negative for adenopathy.   Psychiatric/Behavioral:  Positive for dysphoric mood. Negative for behavioral problems. The patient is nervous/anxious.        Past Medical History:   Diagnosis Date   • Anemia    • BMI 35.0-35.9,adult    • Hypothyroidism      Past Surgical History:   Procedure Laterality Date   • NO PAST SURGERIES       Family History   Problem Relation Age of Onset   • Diabetes Mother    • No Known Problems Father    • Hypertension Maternal Grandmother    • Breast cancer Neg Hx    • Colon cancer Neg Hx    • Ovarian cancer Neg Hx    • Uterine cancer Neg Hx      Social History     Socioeconomic History   • Marital status: Single     Spouse name: None   • Number of children: None   • Years of education: None   • Highest education level: None   Occupational History   • None   Tobacco Use   • Smoking status: Never   • Smokeless tobacco: Never   Vaping Use   • Vaping status: Never Used   Substance and Sexual Activity   • Alcohol use: No   • Drug use: No   • Sexual activity: Never   Other Topics Concern   • None   Social History Narrative   • None     Social Determinants of Health     Financial Resource Strain: Not on file   Food Insecurity: Not on file   Transportation Needs: Not on file   Physical Activity: Not on file   Stress: Not on file   Social Connections: Not on file   Intimate Partner Violence: Not on file   Housing Stability: Not on file     Current Outpatient Medications on  "File Prior to Visit   Medication Sig   • ascorbic acid (VITAMIN C) 500 MG TBCR Take 1 tablet (500 mg total) by mouth daily   • ferrous sulfate 324 (65 Fe) mg Take 1 tablet (324 mg total) by mouth 2 (two) times a day before meals   • levothyroxine (Levoxyl) 125 mcg tablet Take 1 tablet (125 mcg total) by mouth daily   • norgestimate-ethinyl estradiol (Tri-Lo-Monique) 0.18/0.215/0.25 MG-25 MCG per tablet Take 1 tablet by mouth daily     Allergies   Allergen Reactions   • No Active Allergies      Immunization History   Administered Date(s) Administered   • COVID-19 PFIZER VACCINE 0.3 ML IM 08/26/2022   • COVID-19 Pfizer vac (Mario-sucrose, gray cap) 12 yr+ IM 03/30/2023   • HPV 06/23/2017   • HPV9 03/22/2017, 06/23/2017, 11/01/2017   • Hep A, ped/adol, 2 dose 11/01/2017   • Hep B, Adolescent or Pediatric 02/22/2017, 03/22/2017, 06/23/2017   • Hepatitis A 03/22/2017, 11/01/2017   • INFLUENZA 10/12/2018, 01/20/2020   • IPV 02/22/2017, 03/22/2017, 11/01/2017, 01/21/2020   • Influenza, injectable, quadrivalent, preservative free 0.5 mL 01/20/2020, 10/18/2023   • MMR 02/22/2017, 03/22/2017   • MMRV 03/22/2017   • Meningococcal Conjugate (MCV4O) 02/22/2017   • Meningococcal MCV4, Unspecified 02/22/2017, 10/12/2020   • Meningococcal MCV4P 02/22/2017, 10/12/2020   • TD (adult) Preservative Free 03/22/2017   • Td (adult), adsorbed 03/22/2017   • Tdap 02/22/2017, 11/04/2017   • Varicella 02/22/2017, 03/22/2017       Objective     /78 (BP Location: Left arm, Patient Position: Sitting, Cuff Size: Large)   Pulse 98   Temp 99.3 °F (37.4 °C) (Tympanic)   Resp 16   Ht 5' 2\" (1.575 m)   Wt 87.8 kg (193 lb 9.6 oz)   SpO2 99%   BMI 35.41 kg/m²     Physical Exam  Vitals and nursing note reviewed.   Constitutional:       General: She is not in acute distress.     Appearance: Normal appearance. She is not ill-appearing.   HENT:      Head: Normocephalic and atraumatic.      Right Ear: External ear normal.      Left Ear: External " ear normal.   Eyes:      Conjunctiva/sclera: Conjunctivae normal.   Cardiovascular:      Rate and Rhythm: Normal rate and regular rhythm.      Heart sounds: Normal heart sounds.   Pulmonary:      Effort: Pulmonary effort is normal.      Breath sounds: Normal breath sounds.   Musculoskeletal:         General: Normal range of motion.      Cervical back: Normal range of motion.   Skin:     General: Skin is warm and dry.   Neurological:      Mental Status: She is alert and oriented to person, place, and time.   Psychiatric:         Mood and Affect: Mood is anxious.       ADRIAN Beckford

## 2024-01-24 NOTE — ASSESSMENT & PLAN NOTE
- Continue levothyroxine 125 mcg daily.   - Due for updated TSH and free T4.   - Continue routine follow up with Endocrinologist.

## 2024-01-24 NOTE — ASSESSMENT & PLAN NOTE
- Not well controlled.  - Prescription sent for Lexapro 10 mg daily and hydroxyzine TID PRN. Advised of side effects.  - She is on wait list for talk therapy.   - Recommend follow up in 4-6 weeks.

## 2024-01-25 ENCOUNTER — TELEPHONE (OUTPATIENT)
Dept: FAMILY MEDICINE CLINIC | Facility: CLINIC | Age: 19
End: 2024-01-25

## 2024-01-25 DIAGNOSIS — E03.9 ACQUIRED HYPOTHYROIDISM: Primary | ICD-10-CM

## 2024-01-25 RX ORDER — LEVOTHYROXINE SODIUM 137 UG/1
137 TABLET ORAL DAILY
Qty: 90 TABLET | Refills: 1 | Status: SHIPPED | OUTPATIENT
Start: 2024-01-25

## 2024-01-25 RX ORDER — LEVOTHYROXINE SODIUM 0.15 MG/1
150 TABLET ORAL
Qty: 90 TABLET | Refills: 1 | Status: SHIPPED | OUTPATIENT
Start: 2024-01-25 | End: 2024-01-25

## 2024-01-25 NOTE — TELEPHONE ENCOUNTER
Per Yessenia CAMPBELL cancel Levothyroxine 150mcg she sent 137mcg.   I called pharmacy and left message to cancel Levothyroxine 150mcg and to fill the 137mcg.    I tried to call pt but no answer and was unable to leave a voicemail.   Will try again later

## 2024-01-25 NOTE — TELEPHONE ENCOUNTER
Tried to call pt but no answer and unable to leave a voicemail.  Per Yessenia CAMPBELL, cancel Levothyroxine 150mcg and she sent 137mcg instead. I did call the pharmacy about this. Please relay this message to pt when she calls back

## 2024-01-25 NOTE — TELEPHONE ENCOUNTER
Pt received an missed call from thr practice. Tried to warm transfer the call to practice clinical was unsuccessful. Did see the msg from [place]. Patient requested to call her between 12 - 1 pm today during her lunch break. As she won't be able to answer calls during work time.

## 2024-01-25 NOTE — TELEPHONE ENCOUNTER
----- Message from ADRIAN Beckford sent at 1/25/2024  7:24 AM EST -----  TSH is still elevated. I increased levothyroxine to 150 mcg daily and sent prescription to her pharmacy. Recommend repeat TSH in 6-8 weeks.

## 2024-03-19 DIAGNOSIS — F41.9 ANXIETY: ICD-10-CM

## 2024-03-19 DIAGNOSIS — F32.A DEPRESSION, UNSPECIFIED DEPRESSION TYPE: ICD-10-CM

## 2024-03-19 RX ORDER — ESCITALOPRAM OXALATE 10 MG/1
10 TABLET ORAL DAILY
Qty: 30 TABLET | Refills: 1 | Status: SHIPPED | OUTPATIENT
Start: 2024-03-19

## 2024-04-02 ENCOUNTER — OFFICE VISIT (OUTPATIENT)
Dept: FAMILY MEDICINE CLINIC | Facility: CLINIC | Age: 19
End: 2024-04-02
Payer: COMMERCIAL

## 2024-04-02 VITALS
BODY MASS INDEX: 35.77 KG/M2 | HEIGHT: 62 IN | DIASTOLIC BLOOD PRESSURE: 80 MMHG | SYSTOLIC BLOOD PRESSURE: 120 MMHG | RESPIRATION RATE: 16 BRPM | TEMPERATURE: 99.2 F | OXYGEN SATURATION: 98 % | WEIGHT: 194.4 LBS | HEART RATE: 86 BPM

## 2024-04-02 DIAGNOSIS — E03.9 ACQUIRED HYPOTHYROIDISM: ICD-10-CM

## 2024-04-02 DIAGNOSIS — F32.A DEPRESSION, UNSPECIFIED DEPRESSION TYPE: ICD-10-CM

## 2024-04-02 DIAGNOSIS — F41.9 ANXIETY: Primary | ICD-10-CM

## 2024-04-02 PROCEDURE — 99214 OFFICE O/P EST MOD 30 MIN: CPT | Performed by: NURSE PRACTITIONER

## 2024-04-02 RX ORDER — ESCITALOPRAM OXALATE 20 MG/1
20 TABLET ORAL DAILY
Qty: 90 TABLET | Refills: 0 | Status: SHIPPED | OUTPATIENT
Start: 2024-04-02

## 2024-04-02 RX ORDER — HYDROXYZINE HYDROCHLORIDE 10 MG/1
10 TABLET, FILM COATED ORAL EVERY 6 HOURS PRN
Qty: 30 TABLET | Refills: 1 | Status: SHIPPED | OUTPATIENT
Start: 2024-04-02

## 2024-04-02 NOTE — PROGRESS NOTES
Name: Marquita Haynes      : 2005      MRN: [id number]  Encounter Provider: ADRIAN Beckford  Encounter Date: 2024   Encounter department: [facility]    Assessment & Plan     1. Anxiety  Assessment & Plan:  - Not well controlled.  - Will increase Lexapro to 20 mg daily. Discussed side effects.  - Hydroxyzine as needed.   - She is on wait list for talk therapy.  - Will continue to monitor.     Orders:  -     hydrOXYzine HCL (ATARAX) 10 mg tablet; Take 1 tablet (10 mg total) by mouth every 6 (six) hours as needed for anxiety    2. Depression, unspecified depression type  Assessment & Plan:  - Not well controlled.  - Will increase Lexapro to 20 mg daily. Discussed side effects.  - She is on wait list for talk therapy.   - Will continue to monitor.     Orders:  -     escitalopram (LEXAPRO) 20 mg tablet; Take 1 tablet (20 mg total) by mouth daily  -     hydrOXYzine HCL (ATARAX) 10 mg tablet; Take 1 tablet (10 mg total) by mouth every 6 (six) hours as needed for anxiety    3. Acquired hypothyroidism  Assessment & Plan:  - Her levothyroxine was increased to 137 mcg daily.   - She is due for updated TSH and free T4.         Depression Screening and Follow-up Plan: Patient with underlying depression and was advised to continue current medications as prescribed.         Subjective     Patient presents to office today for 6 week follow up regarding anxiety and depression. She was started on Lexapro 10 mg daily and hydroxyzine as needed. She feels like the Lexapro helped at first but is no longer providing as much benefit. She has avoided using the hydroxyzine as it made her tired. She is still due for updated TSH after levothyroxine dose was changed. She denies any other concerns or complaints today.        Review of Systems   Constitutional:  Negative for fatigue and fever.   HENT:  Negative for trouble swallowing.    Eyes:  Negative for visual disturbance.   Respiratory:   Negative for cough and shortness of breath.    Cardiovascular:  Negative for chest pain and palpitations.   Gastrointestinal:  Negative for abdominal pain and blood in stool.   Endocrine: Negative for cold intolerance and heat intolerance.   Genitourinary:  Negative for difficulty urinating and dysuria.   Musculoskeletal:  Negative for gait problem.   Skin:  Negative for rash.   Neurological:  Negative for dizziness, syncope and headaches.   Hematological:  Negative for adenopathy.   Psychiatric/Behavioral:  Positive for dysphoric mood. Negative for behavioral problems, self-injury and suicidal ideas. The patient is nervous/anxious.        Past Medical History:   Diagnosis Date   • Anemia    • BMI 35.0-35.9,adult    • Hypothyroidism      Past Surgical History:   Procedure Laterality Date   • NO PAST SURGERIES       Family History   Problem Relation Age of Onset   • Diabetes Mother    • No Known Problems Father    • Hypertension Maternal Grandmother    • Breast cancer Neg Hx    • Colon cancer Neg Hx    • Ovarian cancer Neg Hx    • Uterine cancer Neg Hx      Social History     Socioeconomic History   • Marital status: Single     Spouse name: None   • Number of children: None   • Years of education: None   • Highest education level: None   Occupational History   • None   Tobacco Use   • Smoking status: Never   • Smokeless tobacco: Never   Vaping Use   • Vaping status: Never Used   Substance and Sexual Activity   • Alcohol use: No   • Drug use: No   • Sexual activity: Never   Other Topics Concern   • None   Social History Narrative   • None     Social Determinants of Health     Financial Resource Strain: Not on file   Food Insecurity: Not on file   Transportation Needs: Not on file   Physical Activity: Not on file   Stress: Not on file   Social Connections: Not on file   Intimate Partner Violence: Not on file   Housing Stability: Not on file     Current Outpatient Medications on File Prior to Visit   Medication Sig   •  "ferrous sulfate 324 (65 Fe) mg Take 1 tablet (324 mg total) by mouth 2 (two) times a day before meals   • hydrOXYzine pamoate (VISTARIL) 25 mg capsule Take 1 capsule (25 mg total) by mouth 3 (three) times a day as needed for anxiety   • levothyroxine (Euthyrox) 137 mcg tablet Take 1 tablet (137 mcg total) by mouth daily   • norgestimate-ethinyl estradiol (Tri-Lo-Monique) 0.18/0.215/0.25 MG-25 MCG per tablet Take 1 tablet by mouth daily   • [DISCONTINUED] escitalopram (LEXAPRO) 10 mg tablet TAKE 1 TABLET BY MOUTH EVERY DAY   • ascorbic acid (VITAMIN C) 500 MG TBCR Take 1 tablet (500 mg total) by mouth daily     Allergies   Allergen Reactions   • No Active Allergies      Immunization History   Administered Date(s) Administered   • COVID-19 PFIZER VACCINE 0.3 ML IM 08/26/2022   • COVID-19 Pfizer vac (Mario-sucrose, gray cap) 12 yr+ IM 03/30/2023   • HPV 06/23/2017   • HPV9 03/22/2017, 06/23/2017, 11/01/2017   • Hep A, ped/adol, 2 dose 11/01/2017   • Hep B, Adolescent or Pediatric 02/22/2017, 03/22/2017, 06/23/2017   • Hepatitis A 03/22/2017, 11/01/2017   • INFLUENZA 10/12/2018, 01/20/2020   • IPV 02/22/2017, 03/22/2017, 11/01/2017, 01/21/2020   • Influenza, injectable, quadrivalent, preservative free 0.5 mL 01/20/2020, 10/18/2023   • MMR 02/22/2017, 03/22/2017   • MMRV 03/22/2017   • Meningococcal Conjugate (MCV4O) 02/22/2017   • Meningococcal MCV4, Unspecified 02/22/2017, 10/12/2020   • Meningococcal MCV4P 02/22/2017, 10/12/2020   • TD (adult) Preservative Free 03/22/2017   • Td (adult), adsorbed 03/22/2017   • Tdap 02/22/2017, 11/04/2017   • Varicella 02/22/2017, 03/22/2017       Objective     /80 (BP Location: Left arm, Patient Position: Sitting, Cuff Size: Large)   Pulse 86   Temp 99.2 °F (37.3 °C) (Tympanic)   Resp 16   Ht 5' 2\" (1.575 m)   Wt 88.2 kg (194 lb 6.4 oz)   SpO2 98%   BMI 35.56 kg/m²     Physical Exam  Vitals and nursing note reviewed.   Constitutional:       General: She is not in acute " distress.     Appearance: Normal appearance. She is not ill-appearing.   HENT:      Head: Normocephalic and atraumatic.      Right Ear: External ear normal.      Left Ear: External ear normal.   Eyes:      Conjunctiva/sclera: Conjunctivae normal.   Cardiovascular:      Rate and Rhythm: Normal rate and regular rhythm.      Heart sounds: Normal heart sounds.   Pulmonary:      Effort: Pulmonary effort is normal.      Breath sounds: Normal breath sounds.   Musculoskeletal:         General: Normal range of motion.      Cervical back: Normal range of motion.   Skin:     General: Skin is warm and dry.   Neurological:      Mental Status: She is alert and oriented to person, place, and time.   Psychiatric:         Mood and Affect: Mood normal.         Behavior: Behavior normal.       ADRIAN Beckford

## 2024-04-02 NOTE — ASSESSMENT & PLAN NOTE
- Not well controlled.  - Will increase Lexapro to 20 mg daily. Discussed side effects.  - Hydroxyzine as needed.   - She is on wait list for talk therapy.  - Will continue to monitor.

## 2024-04-02 NOTE — ASSESSMENT & PLAN NOTE
- Not well controlled.  - Will increase Lexapro to 20 mg daily. Discussed side effects.  - She is on wait list for talk therapy.   - Will continue to monitor.

## 2024-04-03 ENCOUNTER — OFFICE VISIT (OUTPATIENT)
Dept: OBGYN CLINIC | Facility: CLINIC | Age: 19
End: 2024-04-03
Payer: COMMERCIAL

## 2024-04-03 VITALS
WEIGHT: 196.6 LBS | DIASTOLIC BLOOD PRESSURE: 76 MMHG | BODY MASS INDEX: 36.18 KG/M2 | HEIGHT: 62 IN | SYSTOLIC BLOOD PRESSURE: 120 MMHG

## 2024-04-03 DIAGNOSIS — N92.1 MENORRHAGIA WITH IRREGULAR CYCLE: ICD-10-CM

## 2024-04-03 DIAGNOSIS — E28.2 PCOS (POLYCYSTIC OVARIAN SYNDROME): Primary | ICD-10-CM

## 2024-04-03 PROCEDURE — 99213 OFFICE O/P EST LOW 20 MIN: CPT | Performed by: NURSE PRACTITIONER

## 2024-04-03 RX ORDER — NORGESTIMATE AND ETHINYL ESTRADIOL 7DAYSX3 LO
1 KIT ORAL DAILY
Qty: 84 TABLET | Refills: 2 | Status: SHIPPED | OUTPATIENT
Start: 2024-04-03

## 2024-04-03 NOTE — PROGRESS NOTES
Assessment/Plan:    1. PCOS (polycystic ovarian syndrome)  Discussed of PCOS in regard to insulin resistance and future risk for diabetes and heart disease.  To continue on OCP for endometrial protection.  To continue with weight mgt measures and exercise.  Has appt with endocrinology early May-- to discuss trial of metformin.  Continues to fine tune her thyroid management.    2. Menorrhagia with irregular cycle  Refills sent.   12/2024 for yearly and first pap.    - norgestimate-ethinyl estradiol (Tri-Lo-Monique) 0.18/0.215/0.25 MG-25 MCG per tablet; Take 1 tablet by mouth daily  Dispense: 84 tablet; Refill: 2      Subjective:      Patient ID: Marquita Haynes is a 18 y.o. female.    HPI  FOLLOW UP    17 yo G0 presents for PCOS/ menstrual management follow up.    She was last seen by me 9/2023 for follow up of PCOS & menorrhagia with irregular cycle.  Has been taking OCP for menstrual management since that time and presents for a recheck.    10/19/23 consult with endocrine, Dr Herrera.  Hypothyroidism - increased levothyroxine to 125 mcg daily.  Repeat TFTs 6-8w--done 1/24/24 with TSH of 6.9.  Levohyroxine was then increased to 137 mcg per her PCP.  She has an order to repeat her TFTs.  She was offered metformin by Dr. Herrera, but patient declined at that time. She is now open to giving it a try.  She has an appt with Dr. Herrera for follow up early May and will discuss metformin further at that time.    Has been having regular bleeding during her placebo pills.  Denies ACHES or break through bleeding.  BP is normal.  Notes an itching of her right breast for the past 3 months.    The following portions of the patient's history were reviewed and updated as appropriate: allergies, current medications, past family history, past medical history, past social history, past surgical history, and problem list.    Review of Systems   Constitutional:  Negative for chills and fever.   Genitourinary: Negative.       "    Objective:    /76 (BP Location: Right arm, Patient Position: Sitting)   Ht 5' 2\" (1.575 m)   Wt 89.2 kg (196 lb 9.6 oz)   LMP 04/03/2024 (Exact Date)   BMI 35.96 kg/m²      Physical Exam  Constitutional:       General: She is not in acute distress.     Appearance: Normal appearance. She is not ill-appearing or diaphoretic.      Comments: Bmi 36   HENT:      Head: Normocephalic and atraumatic.   Eyes:      Pupils: Pupils are equal, round, and reactive to light.   Pulmonary:      Effort: Pulmonary effort is normal.   Skin:     General: Skin is warm and dry.   Neurological:      General: No focal deficit present.      Mental Status: She is alert and oriented to person, place, and time.   Psychiatric:         Mood and Affect: Mood normal.         Behavior: Behavior normal.         Thought Content: Thought content normal.         Judgment: Judgment normal.           "

## 2024-04-03 NOTE — PATIENT INSTRUCTIONS
No soaps on breasts.  Non perfumed body moisturizer.  Topical hydrocortisone cream a couple times a day until itching resolves.  Follow up with PCP or dermatology as needed.

## 2024-04-13 ENCOUNTER — APPOINTMENT (OUTPATIENT)
Dept: LAB | Age: 19
End: 2024-04-13
Payer: COMMERCIAL

## 2024-04-13 DIAGNOSIS — E03.9 ACQUIRED HYPOTHYROIDISM: ICD-10-CM

## 2024-04-13 LAB
T4 FREE SERPL-MCNC: 0.88 NG/DL (ref 0.61–1.12)
TSH SERPL DL<=0.05 MIU/L-ACNC: 5.96 UIU/ML (ref 0.45–4.5)

## 2024-04-13 PROCEDURE — 84443 ASSAY THYROID STIM HORMONE: CPT

## 2024-04-13 PROCEDURE — 36415 COLL VENOUS BLD VENIPUNCTURE: CPT

## 2024-04-13 PROCEDURE — 84439 ASSAY OF FREE THYROXINE: CPT

## 2024-04-18 DIAGNOSIS — E03.9 ACQUIRED HYPOTHYROIDISM: Primary | ICD-10-CM

## 2024-04-18 RX ORDER — LEVOTHYROXINE SODIUM 0.15 MG/1
150 TABLET ORAL
Qty: 90 TABLET | Refills: 1 | Status: SHIPPED | OUTPATIENT
Start: 2024-04-18

## 2024-04-19 ENCOUNTER — TELEPHONE (OUTPATIENT)
Dept: FAMILY MEDICINE CLINIC | Facility: CLINIC | Age: 19
End: 2024-04-19

## 2024-04-19 NOTE — TELEPHONE ENCOUNTER
----- Message from ADRIAN Beckford sent at 4/18/2024 10:59 AM EDT -----  TSH remains elevated. I increased her levothyroxine to 150 mcg daily. Repeat TSH in 6-8 weeks.

## 2024-05-09 ENCOUNTER — OFFICE VISIT (OUTPATIENT)
Dept: ENDOCRINOLOGY | Facility: CLINIC | Age: 19
End: 2024-05-09
Payer: COMMERCIAL

## 2024-05-09 VITALS
WEIGHT: 195 LBS | SYSTOLIC BLOOD PRESSURE: 102 MMHG | OXYGEN SATURATION: 98 % | HEART RATE: 112 BPM | DIASTOLIC BLOOD PRESSURE: 80 MMHG | HEIGHT: 62 IN | BODY MASS INDEX: 35.88 KG/M2

## 2024-05-09 DIAGNOSIS — E03.9 ACQUIRED HYPOTHYROIDISM: ICD-10-CM

## 2024-05-09 DIAGNOSIS — E28.2 PCOS (POLYCYSTIC OVARIAN SYNDROME): Primary | ICD-10-CM

## 2024-05-09 PROCEDURE — 99214 OFFICE O/P EST MOD 30 MIN: CPT | Performed by: INTERNAL MEDICINE

## 2024-05-09 RX ORDER — LEVOTHYROXINE SODIUM 0.15 MG/1
150 TABLET ORAL
Qty: 90 TABLET | Refills: 1 | Status: SHIPPED | OUTPATIENT
Start: 2024-05-09

## 2024-05-09 RX ORDER — METFORMIN HYDROCHLORIDE 500 MG/1
TABLET, EXTENDED RELEASE ORAL
Qty: 30 TABLET | Refills: 4 | Status: SHIPPED | OUTPATIENT
Start: 2024-05-09

## 2024-05-09 NOTE — PROGRESS NOTES
Chief Complaint   Patient presents with    Hypothyroidism    Polycystic Ovary Syndrome      Referring Provider  No referring provider defined for this encounter.     History of Present Illness:   Marquita Haynes is a 18 y.o. female with hypothyroidism seen in follow-up. Last in the office in Oct 2023.    She has a hx of hypothyroidism dx in 2018 and recently dx PCOS. She is seeing GYN is on OCPS. We had discussed metformin at her last visit, but she wanted to wait. She saw GYN again and now is interested in trying it. Her mother has T2DM.      Hypothyroidism was dx in 2018. She was given levothyroxine, but has been inconsistent in taking this over the years.     Her current symptoms include fatigue, difficulty to lose weight, and hair loss. Her mood has also been decreased and it is impactng her schoolwork. She denies changes in her neck, change in her voice or dysphagia. There are aunts with hypothyroidism. She denies radiation exposure to head/neck/chest.      Her current dose is Euthyrox (levothyroxine) 150mcg daily. This was increased but her PCP from 137mcg  about 3 weeks ago. She takes this in the morning and does not take with other medications.     Also, she was just started on iron supplements and has noted some constipation on iron. She is  this from her levothyroxine.     Patient Active Problem List   Diagnosis    Acquired hypothyroidism    Absolute anemia    Abnormal uterine bleeding (AUB)    Exercise counseling    Vitamin D insufficiency    Other headache syndrome    Neck pain    Nutritional counseling    Arthralgia    Chronic fatigue    Crushing injury of great toe, left, initial encounter    Irregular periods    PCOS (polycystic ovarian syndrome)    Depression    Need for influenza vaccination    Anxiety      Past Medical History:   Diagnosis Date    Anemia     BMI 35.0-35.9,adult     Depression     Hypothyroidism       Past Surgical History:   Procedure Laterality Date    NO PAST SURGERIES    "     Family History   Problem Relation Age of Onset    Diabetes Mother     No Known Problems Father     Hypertension Maternal Grandmother     Breast cancer Neg Hx     Colon cancer Neg Hx     Ovarian cancer Neg Hx     Uterine cancer Neg Hx      Social History     Tobacco Use    Smoking status: Never    Smokeless tobacco: Never   Substance Use Topics    Alcohol use: No     Allergies   Allergen Reactions    No Active Allergies          Current Outpatient Medications:     ascorbic acid (VITAMIN C) 500 MG TBCR, Take 1 tablet (500 mg total) by mouth daily, Disp: 90 tablet, Rfl: 1    escitalopram (LEXAPRO) 20 mg tablet, Take 1 tablet (20 mg total) by mouth daily, Disp: 90 tablet, Rfl: 0    ferrous sulfate 324 (65 Fe) mg, Take 1 tablet (324 mg total) by mouth 2 (two) times a day before meals, Disp: 180 tablet, Rfl: 1    hydrOXYzine HCL (ATARAX) 10 mg tablet, Take 1 tablet (10 mg total) by mouth every 6 (six) hours as needed for anxiety, Disp: 30 tablet, Rfl: 1    levothyroxine (Euthyrox) 150 mcg tablet, Take 1 tablet (150 mcg total) by mouth daily in the early morning, Disp: 90 tablet, Rfl: 1    metFORMIN (GLUCOPHAGE-XR) 500 mg 24 hr tablet, One pill by mouth once daily, Disp: 30 tablet, Rfl: 4    norgestimate-ethinyl estradiol (Tri-Lo-Monique) 0.18/0.215/0.25 MG-25 MCG per tablet, Take 1 tablet by mouth daily, Disp: 84 tablet, Rfl: 2  Review of Systems   Constitutional:  Positive for appetite change (decreased) and fatigue (improving). Negative for unexpected weight change.   HENT:  Negative for trouble swallowing and voice change.    Gastrointestinal:  Negative for constipation and diarrhea.   Genitourinary:  Negative for menstrual problem.   Skin:         See HPI   Neurological:  Negative for weakness.   Psychiatric/Behavioral:  Positive for decreased concentration.        Physical Exam:  Body mass index is 35.67 kg/m².  /80   Pulse (!) 112   Ht 5' 2\" (1.575 m)   Wt 88.5 kg (195 lb)   SpO2 98%   BMI 35.67 kg/m² "    Wt Readings from Last 3 Encounters:   05/09/24 88.5 kg (195 lb)   04/03/24 89.2 kg (196 lb 9.6 oz)   04/02/24 88.2 kg (194 lb 6.4 oz)       Physical Exam   Gen: appears well-developed and well-nourished. No apparent distress.   Head: Normocephalic and atraumatic.   Eyes: no stare or proptosis, no periorbital edema  E/N/M nl facies, hearing grossly intact  Neck: range of motion.   Pulmonary/Chest: breathing  comfortably, no accessory muscle use, effort normal.   Musculoskeletal: moves all 4 extremities  Neurological: alert and oriented to person, place, and time. No upper ext tremor appreciated  Skin: does not appear diaphoretic, no facial plethora  Psychiatric: normal mood and affect; behavior is normal; no gross lapses in memory, answer questions appropriately          DATA:  Labs:   Lab Results   Component Value Date    RML1PCOVLTPI 5.963 (H) 04/13/2024         Lab Results   Component Value Date    FREET4 0.88 04/13/2024     Lab Results   Component Value Date     04/05/2018    SODIUM 134 (L) 04/01/2023    K 3.7 04/01/2023     04/01/2023    CO2 25 04/01/2023    ANIONGAP 11 04/05/2018    AGAP 3 (L) 04/01/2023    BUN 8 04/01/2023    CREATININE 0.86 04/01/2023    GLUF 84 04/01/2023    CALCIUM 9.0 04/01/2023    AST 23 04/01/2023    ALT 33 04/01/2023    ALKPHOS 77 04/01/2023    PROT 7.9 04/05/2018    TP 8.1 04/01/2023    BILITOT 0.2 04/05/2018    TBILI 0.36 04/01/2023    EGFR 98 04/01/2023           Radiology    Impression:  1. PCOS (polycystic ovarian syndrome)    2. Acquired hypothyroidism             Plan:    Marquita was seen today for hypothyroidism and polycystic ovary syndrome.    Diagnoses and all orders for this visit:    PCOS (polycystic ovarian syndrome)  -     Comprehensive metabolic panel; Future  -     Hemoglobin A1C; Future  -     metFORMIN (GLUCOPHAGE-XR) 500 mg 24 hr tablet; One pill by mouth once daily    Acquired hypothyroidism  -     levothyroxine (Euthyrox) 150 mcg tablet; Take 1 tablet  (150 mcg total) by mouth daily in the early morning  -     TSH, 3rd generation; Future           Hypothyroidism: She has been more consistent with levothyroxine and has noted improvement on higher doses. She will continue levothyroxine 150mcg. Will get labs in 3 weeks     PCOS: She would like to try metformin and will try ER  500mg one tab daily. She may take this with dinner or at bedtime. Suggested taking ti with food. She is at increased risk for T2DM with family hx, PCOS, and weight. Will get A1c, CMP with next labs.       Discussed with the patient and all questioned fully answered. She will call me if any problems arise.  RTC in 6mos    Marylou Herrera MD

## 2024-05-29 ENCOUNTER — OFFICE VISIT (OUTPATIENT)
Dept: FAMILY MEDICINE CLINIC | Facility: CLINIC | Age: 19
End: 2024-05-29
Payer: COMMERCIAL

## 2024-05-29 ENCOUNTER — TELEPHONE (OUTPATIENT)
Dept: FAMILY MEDICINE CLINIC | Facility: CLINIC | Age: 19
End: 2024-05-29

## 2024-05-29 VITALS
TEMPERATURE: 98.6 F | WEIGHT: 195 LBS | BODY MASS INDEX: 35.88 KG/M2 | OXYGEN SATURATION: 99 % | RESPIRATION RATE: 16 BRPM | HEART RATE: 91 BPM | HEIGHT: 62 IN

## 2024-05-29 DIAGNOSIS — E66.09 CLASS 2 OBESITY DUE TO EXCESS CALORIES WITHOUT SERIOUS COMORBIDITY WITH BODY MASS INDEX (BMI) OF 35.0 TO 35.9 IN ADULT: Primary | ICD-10-CM

## 2024-05-29 DIAGNOSIS — E28.2 PCOS (POLYCYSTIC OVARIAN SYNDROME): ICD-10-CM

## 2024-05-29 DIAGNOSIS — R11.0 NAUSEA: ICD-10-CM

## 2024-05-29 DIAGNOSIS — F32.A DEPRESSION, UNSPECIFIED DEPRESSION TYPE: ICD-10-CM

## 2024-05-29 DIAGNOSIS — E03.9 ACQUIRED HYPOTHYROIDISM: ICD-10-CM

## 2024-05-29 DIAGNOSIS — F41.9 ANXIETY: ICD-10-CM

## 2024-05-29 PROBLEM — E66.812 CLASS 2 OBESITY DUE TO EXCESS CALORIES WITHOUT SERIOUS COMORBIDITY WITH BODY MASS INDEX (BMI) OF 35.0 TO 35.9 IN ADULT: Status: ACTIVE | Noted: 2024-05-29

## 2024-05-29 PROCEDURE — 99214 OFFICE O/P EST MOD 30 MIN: CPT | Performed by: NURSE PRACTITIONER

## 2024-05-29 RX ORDER — ONDANSETRON 4 MG/1
4 TABLET, FILM COATED ORAL EVERY 8 HOURS PRN
Qty: 20 TABLET | Refills: 0 | Status: SHIPPED | OUTPATIENT
Start: 2024-05-29

## 2024-05-29 NOTE — PROGRESS NOTES
Ambulatory Visit  Name: Marquita Haynes      : 2005      MRN: [id number]  Encounter Provider: ADRIAN Beckford  Encounter Date: 2024   Encounter department: [facility]    Assessment & Plan   1. Class 2 obesity due to excess calories without serious comorbidity with body mass index (BMI) of 35.0 to 35.9 in adult  Assessment & Plan:  - Prescription sent for Wegovy 0.25 mg weekly. Discussed side effects.  - Encourage healthy diet and regular exercise.  - Recommend follow up in 1 month.   Orders:  -     Semaglutide-Weight Management (WEGOVY) 0.25 MG/0.5ML; Inject 0.5 mL (0.25 mg total) under the skin once a week for 4 doses  2. Nausea  -     ondansetron (ZOFRAN) 4 mg tablet; Take 1 tablet (4 mg total) by mouth every 8 (eight) hours as needed for nausea or vomiting  3. PCOS (polycystic ovarian syndrome)  Assessment & Plan:  - Placed on Metformin by Endocrinology.   - Continue follow up with them as well as OB/GYN.   4. Acquired hypothyroidism  Assessment & Plan:  - Levothyroxine was increased to 150 mcg daily.   - She is due for repeat TSH.   5. Depression, unspecified depression type  Assessment & Plan:  - Stable on Lexapro to 20 mg daily. Continue same.  - She is on wait list for talk therapy.   - Will continue to monitor.   6. Anxiety  Assessment & Plan:  - Stable on Lexapro to 20 mg daily. Continue same.  - Hydroxyzine as needed.  - She is on wait list for talk therapy.   - Will continue to monitor.          History of Present Illness   {Disappearing Hyperlinks I Encounters * My Last Note * Since Last Visit * History :[id number]}  Patient with PMH of hypothyroidism, PCOS, anxiety, and depression presents to office today for follow up. She is taking her prescribed medications and reports no side effects. Her levothyroxine was increased to 150 mcg last month. She is due for repeat TSH. She was started on Metformin for her PCOS. She is interested in medication options to lose  weight. She denies any other concerns or complaints today.           Review of Systems   Constitutional:  Negative for fatigue and fever.   HENT:  Negative for trouble swallowing.    Eyes:  Negative for visual disturbance.   Respiratory:  Negative for cough and shortness of breath.    Cardiovascular:  Negative for chest pain and palpitations.   Gastrointestinal:  Negative for abdominal pain and blood in stool.   Endocrine: Negative for cold intolerance and heat intolerance.   Genitourinary:  Negative for difficulty urinating and dysuria.   Musculoskeletal:  Negative for gait problem.   Skin:  Negative for rash.   Neurological:  Negative for dizziness, syncope and headaches.   Hematological:  Negative for adenopathy.   Psychiatric/Behavioral:  Negative for behavioral problems.      Past Medical History:   Diagnosis Date   • Anemia    • BMI 35.0-35.9,adult    • Depression    • Hypothyroidism      Past Surgical History:   Procedure Laterality Date   • NO PAST SURGERIES       Family History   Problem Relation Age of Onset   • Diabetes Mother    • No Known Problems Father    • Hypertension Maternal Grandmother    • Breast cancer Neg Hx    • Colon cancer Neg Hx    • Ovarian cancer Neg Hx    • Uterine cancer Neg Hx      Social History     Tobacco Use   • Smoking status: Never   • Smokeless tobacco: Never   Vaping Use   • Vaping status: Never Used   Substance and Sexual Activity   • Alcohol use: No   • Drug use: No   • Sexual activity: Never     Current Outpatient Medications on File Prior to Visit   Medication Sig   • escitalopram (LEXAPRO) 20 mg tablet Take 1 tablet (20 mg total) by mouth daily   • hydrOXYzine HCL (ATARAX) 10 mg tablet Take 1 tablet (10 mg total) by mouth every 6 (six) hours as needed for anxiety   • levothyroxine (Euthyrox) 150 mcg tablet Take 1 tablet (150 mcg total) by mouth daily in the early morning   • metFORMIN (GLUCOPHAGE-XR) 500 mg 24 hr tablet One pill by mouth once daily   •  "norgestimate-ethinyl estradiol (Tri-Lo-Monique) 0.18/0.215/0.25 MG-25 MCG per tablet Take 1 tablet by mouth daily   • ascorbic acid (VITAMIN C) 500 MG TBCR Take 1 tablet (500 mg total) by mouth daily   • ferrous sulfate 324 (65 Fe) mg Take 1 tablet (324 mg total) by mouth 2 (two) times a day before meals     Allergies   Allergen Reactions   • No Active Allergies      Immunization History   Administered Date(s) Administered   • COVID-19 PFIZER VACCINE 0.3 ML IM 08/26/2022   • COVID-19 Pfizer vac (Mario-sucrose, gray cap) 12 yr+ IM 08/26/2022, 03/30/2023   • HPV 06/23/2017   • HPV9 03/22/2017, 06/23/2017, 11/01/2017   • Hep A, ped/adol, 2 dose 11/01/2017   • Hep B, Adolescent or Pediatric 02/22/2017, 03/22/2017, 06/23/2017   • Hepatitis A 03/22/2017, 11/01/2017   • INFLUENZA 10/12/2018, 01/20/2020   • IPV 02/22/2017, 03/22/2017, 11/01/2017, 01/21/2020   • Influenza, injectable, quadrivalent, preservative free 0.5 mL 01/20/2020, 10/18/2023   • MMR 02/22/2017, 03/22/2017   • MMRV 03/22/2017   • Meningococcal Conjugate (MCV4O) 02/22/2017   • Meningococcal MCV4, Unspecified 02/22/2017, 10/12/2020   • Meningococcal MCV4P 02/22/2017, 10/12/2020   • TD (adult) Preservative Free 03/22/2017   • Td (adult), adsorbed 03/22/2017   • Tdap 02/22/2017, 11/04/2017   • Varicella 02/22/2017, 03/22/2017     Objective   {Disappearing Hyperlinks   Review Vitals * Enter New Vitals * Results Review * Labs * Imaging * Cardiology * Procedures * Lung Cancer Screening :[id number]}  Pulse 91   Temp 98.6 °F (37 °C) (Tympanic)   Resp 16   Ht 5' 2\" (1.575 m)   Wt 88.5 kg (195 lb)   SpO2 99%   BMI 35.67 kg/m²     Physical Exam  Vitals and nursing note reviewed.   Constitutional:       Appearance: Normal appearance. She is well-developed.   HENT:      Head: Normocephalic and atraumatic.      Right Ear: External ear normal.      Left Ear: External ear normal.   Eyes:      Conjunctiva/sclera: Conjunctivae normal.   Cardiovascular:      Rate and Rhythm: " Normal rate and regular rhythm.      Heart sounds: Normal heart sounds.   Pulmonary:      Effort: Pulmonary effort is normal.      Breath sounds: Normal breath sounds.   Musculoskeletal:         General: Normal range of motion.      Cervical back: Normal range of motion.   Skin:     General: Skin is warm and dry.   Neurological:      Mental Status: She is alert and oriented to person, place, and time.   Psychiatric:         Mood and Affect: Mood normal.         Behavior: Behavior normal.       Administrative Statements {Disappearing Hyperlinks I  [id number]}

## 2024-05-29 NOTE — ASSESSMENT & PLAN NOTE
- Prescription sent for Wegovy 0.25 mg weekly. Discussed side effects.  - Encourage healthy diet and regular exercise.  - Recommend follow up in 1 month.

## 2024-05-29 NOTE — ASSESSMENT & PLAN NOTE
- Stable on Lexapro to 20 mg daily. Continue same.  - Hydroxyzine as needed.  - She is on wait list for talk therapy.   - Will continue to monitor.

## 2024-05-29 NOTE — ASSESSMENT & PLAN NOTE
- Stable on Lexapro to 20 mg daily. Continue same.  - She is on wait list for talk therapy.   - Will continue to monitor.

## 2024-06-01 NOTE — TELEPHONE ENCOUNTER
PA for Semaglutide-Weight Management WEGOVY) 0.25 (MG/0.5ML     Submitted via    [x]CMM-KEY [id number]  []SurescriLoggly-Case ID #   []Faxed to plan   []Other website   []Phone call Case ID #     Office notes sent, clinical questions answered. Awaiting determination    Turnaround time for your insurance to make a decision on your Prior Authorization can take 7-21 business days.

## 2024-06-05 NOTE — TELEPHONE ENCOUNTER
PA for Wegovy 0.25mg Approved     Date(s) approved 06/01/2024-12/01/2024    Case #    Patient advised by          [x] MyChart Message  [] Phone call   []LMOM  []L/M to call office as no active Communication consent on file  []Unable to leave detailed message as VM not approved on Communication consent       Pharmacy advised by    [x]Fax  []Phone call    Approval letter scanned into Media Yes

## 2024-06-13 ENCOUNTER — TELEPHONE (OUTPATIENT)
Dept: PSYCHIATRY | Facility: CLINIC | Age: 19
End: 2024-06-13

## 2024-06-13 NOTE — TELEPHONE ENCOUNTER
[facility], patient will be removed from psychiatric wait lists.

## 2024-06-13 NOTE — TELEPHONE ENCOUNTER
"Behavioral Health Outpatient Intake Questions    Referred By   : PCP, Yessenia Henry    Please advise interviewee that they need to answer all questions truthfully to allow for best care, and any misrepresentations of information may affect their ability to be seen at this clinic   => Was this discussed? No     If Minor Child (under age 18)    Who is/are the legal guardian(s) of the child?     Is there a custody agreement?      If \"YES\"- Custody orders must be obtained prior to scheduling the first appointment  In addition, Consent to Treatment must be signed by all legal guardians prior to scheduling the first appointment    If \"NO\"- Consent to Treatment must be signed by all legal guardians prior to scheduling the first appointment    Behavioral Health Outpatient Intake History -     Presenting Problem (in patient's own words):     Patient stated she has been going through depression for a long time and have reached out to many places for solutions and did not work for her.  Patient was recommended to seek talk therapy services and is also experiencing behavioral issues and is unable to resolve her issues.  Patient stated that her behavioral issues are self harm when undergoing stress and has severe procrastination issues and is unable to make decisions for herself.    Are there any communication barriers for this patient?     No                                               If yes, please describe barriers:   If there is a unique situation, please refer to Adelso Churchill/Leah Montgomery for final determination.    Are you taking any psychiatric medications? Yes     If \"YES\" -What are they Lexapro (pt states she believes it helps), Atarax (makes pt nauseous and dizzy)    If \"YES\" -Who prescribes? Yessenia Henry    Has the Patient previously received outpatient Talk Therapy or Medication Management from [facility]  No        If \"YES\"- When, Where and with Whom?         If \"NO\" -Has Patient received these services " "elsewhere?       If \"YES\" -When, Where, and with Whom? Online talk therapy, through [facility] for about 3 months    Has the Patient abused alcohol or other substances in the last 6 months ? No  No concerns of substance abuse are reported.     If \"YES\" -What substance, How much, How often?     If illegal substance: Refer to [facility] (for [facility]) or [facility].   If Alcohol in excess of 10 drinks per week:  Refer to [facility] (for [facility]) or [facility]    Legal History-     Is this treatment court ordered? No   If \"yes \"send to :  Talk Therapy : Send to Adelso Churchill/Leah Montgomery for final determination   Med Management: Send to Dr Beach for final determination     Has the Patient been convicted of a felony?  No   If \"Yes\" send to -When, What?  Talk Therapy : Send to Adelso Montgomery for final determination   Med Management: Send to Dr Beach for final determination     ACCEPTED as a patient Yes  If \"Yes\" Appointment Date: [facility] 7/22 & 8/05  Pt was informed to arrive 15 min prior to appts, NP packet will be sent via [employer], no further questions or concerns from pt.    Referred Elsewhere? No  If “Yes” - (Where? Ex: [facility], [facility], [facility], [facility], etc.)       Name of Insurance Co:[employer]  Insurance ID#[id number]   Insurance Phone #[phone]   If ins is primary or secondary?  If patient is a minor, parents information such as Name, D.O.B of guarantor.  "

## 2024-06-28 ENCOUNTER — LAB (OUTPATIENT)
Dept: LAB | Age: 19
End: 2024-06-28
Payer: COMMERCIAL

## 2024-06-28 ENCOUNTER — OFFICE VISIT (OUTPATIENT)
Dept: FAMILY MEDICINE CLINIC | Facility: CLINIC | Age: 19
End: 2024-06-28
Payer: COMMERCIAL

## 2024-06-28 VITALS
DIASTOLIC BLOOD PRESSURE: 78 MMHG | WEIGHT: 187.6 LBS | HEIGHT: 62 IN | RESPIRATION RATE: 16 BRPM | SYSTOLIC BLOOD PRESSURE: 120 MMHG | OXYGEN SATURATION: 98 % | HEART RATE: 112 BPM | TEMPERATURE: 99.6 F | BODY MASS INDEX: 34.52 KG/M2

## 2024-06-28 DIAGNOSIS — E66.09 CLASS 1 OBESITY DUE TO EXCESS CALORIES WITH SERIOUS COMORBIDITY AND BODY MASS INDEX (BMI) OF 34.0 TO 34.9 IN ADULT: Primary | ICD-10-CM

## 2024-06-28 DIAGNOSIS — E28.2 PCOS (POLYCYSTIC OVARIAN SYNDROME): ICD-10-CM

## 2024-06-28 DIAGNOSIS — E03.9 ACQUIRED HYPOTHYROIDISM: ICD-10-CM

## 2024-06-28 DIAGNOSIS — F41.9 ANXIETY: ICD-10-CM

## 2024-06-28 DIAGNOSIS — F32.A DEPRESSION, UNSPECIFIED DEPRESSION TYPE: ICD-10-CM

## 2024-06-28 PROBLEM — E66.811 CLASS 1 OBESITY DUE TO EXCESS CALORIES WITH SERIOUS COMORBIDITY AND BODY MASS INDEX (BMI) OF 34.0 TO 34.9 IN ADULT: Status: ACTIVE | Noted: 2024-05-29

## 2024-06-28 LAB
ALBUMIN SERPL BCG-MCNC: 4.1 G/DL (ref 3.5–5)
ALP SERPL-CCNC: 73 U/L (ref 34–104)
ALT SERPL W P-5'-P-CCNC: 15 U/L (ref 7–52)
ANION GAP SERPL CALCULATED.3IONS-SCNC: 12 MMOL/L (ref 4–13)
AST SERPL W P-5'-P-CCNC: 22 U/L (ref 13–39)
BILIRUB SERPL-MCNC: 0.32 MG/DL (ref 0.2–1)
BUN SERPL-MCNC: 10 MG/DL (ref 5–25)
CALCIUM SERPL-MCNC: 9.7 MG/DL (ref 8.4–10.2)
CHLORIDE SERPL-SCNC: 103 MMOL/L (ref 96–108)
CO2 SERPL-SCNC: 22 MMOL/L (ref 21–32)
CREAT SERPL-MCNC: 0.74 MG/DL (ref 0.6–1.3)
EST. AVERAGE GLUCOSE BLD GHB EST-MCNC: 117 MG/DL
GFR SERPL CREATININE-BSD FRML MDRD: 116 ML/MIN/1.73SQ M
GLUCOSE P FAST SERPL-MCNC: 100 MG/DL (ref 65–99)
HBA1C MFR BLD: 5.7 %
POTASSIUM SERPL-SCNC: 4.2 MMOL/L (ref 3.5–5.3)
PROT SERPL-MCNC: 7.7 G/DL (ref 6.4–8.4)
SODIUM SERPL-SCNC: 137 MMOL/L (ref 135–147)
T4 FREE SERPL-MCNC: 1.28 NG/DL (ref 0.61–1.12)
TSH SERPL DL<=0.05 MIU/L-ACNC: 0.03 UIU/ML (ref 0.45–4.5)

## 2024-06-28 PROCEDURE — 84439 ASSAY OF FREE THYROXINE: CPT

## 2024-06-28 PROCEDURE — 84443 ASSAY THYROID STIM HORMONE: CPT

## 2024-06-28 PROCEDURE — 80053 COMPREHEN METABOLIC PANEL: CPT

## 2024-06-28 PROCEDURE — 36415 COLL VENOUS BLD VENIPUNCTURE: CPT

## 2024-06-28 PROCEDURE — 99214 OFFICE O/P EST MOD 30 MIN: CPT | Performed by: NURSE PRACTITIONER

## 2024-06-28 PROCEDURE — 83036 HEMOGLOBIN GLYCOSYLATED A1C: CPT

## 2024-06-28 NOTE — ASSESSMENT & PLAN NOTE
She is down 8 pounds since last visit.  Will increase Wegovy to 0.5 mg weekly.  Discussed side effects.  Encourage healthy diet and regular exercise.  Recommend follow-up in 1 month.

## 2024-06-28 NOTE — PROGRESS NOTES
Ambulatory Visit  Name: Marquita Haynes      : 2005      MRN: [id number]  Encounter Provider: ADRIAN Beckford  Encounter Date: 2024   Encounter department: [facility]    Assessment & Plan   1. Class 1 obesity due to excess calories with serious comorbidity and body mass index (BMI) of 34.0 to 34.9 in adult  Assessment & Plan:  She is down 8 pounds since last visit.  Will increase Wegovy to 0.5 mg weekly.  Discussed side effects.  Encourage healthy diet and regular exercise.  Recommend follow-up in 1 month.  Orders:  -     Semaglutide-Weight Management (WEGOVY) 0.5 MG/0.5ML; Inject 0.5 mL (0.5 mg total) under the skin once a week for 4 doses  2. Acquired hypothyroidism  Assessment & Plan:  - Levothyroxine was increased to 150 mcg daily.   - She is due for repeat TSH.   3. Depression, unspecified depression type  Assessment & Plan:  - Stable on Lexapro to 20 mg daily. Continue same.  - She is on wait list for talk therapy.   - Will continue to monitor.   4. Anxiety  Assessment & Plan:  - Stable on Lexapro to 20 mg daily. Continue same.  - Hydroxyzine as needed.  - She is on wait list for talk therapy.   - Will continue to monitor.          History of Present Illness   {Disappearing Hyperlinks I Encounters * My Last Note * Since Last Visit * History :83746}  Patient presents office today for 1 month follow-up.  She is taking Wegovy 0.25 mg weekly for weight loss.  She is down 8 pounds since last visit.  Complains of mild nausea but denies any other significant side effects.  She is due for repeat blood work after her levothyroxine dose was changed.  She denies any other concerns or complaints today.          Review of Systems   Constitutional:  Negative for fatigue and fever.   HENT:  Negative for trouble swallowing.    Eyes:  Negative for visual disturbance.   Respiratory:  Negative for cough and shortness of breath.    Cardiovascular:  Negative for chest pain and  palpitations.   Gastrointestinal:  Negative for abdominal pain and blood in stool.   Endocrine: Negative for cold intolerance and heat intolerance.   Genitourinary:  Negative for difficulty urinating and dysuria.   Musculoskeletal:  Negative for gait problem.   Skin:  Negative for rash.   Neurological:  Negative for dizziness, syncope and headaches.   Hematological:  Negative for adenopathy.   Psychiatric/Behavioral:  Negative for behavioral problems.      Past Medical History:   Diagnosis Date   • Anemia    • BMI 35.0-35.9,adult    • Depression    • Hypothyroidism      Past Surgical History:   Procedure Laterality Date   • NO PAST SURGERIES       Family History   Problem Relation Age of Onset   • Diabetes Mother    • No Known Problems Father    • Hypertension Maternal Grandmother    • Breast cancer Neg Hx    • Colon cancer Neg Hx    • Ovarian cancer Neg Hx    • Uterine cancer Neg Hx      Social History     Tobacco Use   • Smoking status: Never   • Smokeless tobacco: Never   Vaping Use   • Vaping status: Never Used   Substance and Sexual Activity   • Alcohol use: No   • Drug use: No   • Sexual activity: Never     Current Outpatient Medications on File Prior to Visit   Medication Sig   • escitalopram (LEXAPRO) 20 mg tablet Take 1 tablet (20 mg total) by mouth daily   • hydrOXYzine HCL (ATARAX) 10 mg tablet Take 1 tablet (10 mg total) by mouth every 6 (six) hours as needed for anxiety   • levothyroxine (Euthyrox) 150 mcg tablet Take 1 tablet (150 mcg total) by mouth daily in the early morning   • metFORMIN (GLUCOPHAGE-XR) 500 mg 24 hr tablet One pill by mouth once daily   • norgestimate-ethinyl estradiol (Tri-Lo-Monique) 0.18/0.215/0.25 MG-25 MCG per tablet Take 1 tablet by mouth daily   • ondansetron (ZOFRAN) 4 mg tablet Take 1 tablet (4 mg total) by mouth every 8 (eight) hours as needed for nausea or vomiting   • ascorbic acid (VITAMIN C) 500 MG TBCR Take 1 tablet (500 mg total) by mouth daily   • ferrous sulfate 324  "(65 Fe) mg Take 1 tablet (324 mg total) by mouth 2 (two) times a day before meals     Allergies   Allergen Reactions   • No Active Allergies      Immunization History   Administered Date(s) Administered   • COVID-19 PFIZER VACCINE 0.3 ML IM 08/26/2022   • COVID-19 Pfizer vac (Mario-sucrose, gray cap) 12 yr+ IM 08/26/2022, 03/30/2023   • HPV 06/23/2017   • HPV9 03/22/2017, 06/23/2017, 11/01/2017   • Hep A, ped/adol, 2 dose 11/01/2017   • Hep B, Adolescent or Pediatric 02/22/2017, 03/22/2017, 06/23/2017   • Hepatitis A 03/22/2017, 11/01/2017   • INFLUENZA 10/12/2018, 01/20/2020   • IPV 02/22/2017, 03/22/2017, 11/01/2017, 01/21/2020   • Influenza, injectable, quadrivalent, preservative free 0.5 mL 01/20/2020, 10/18/2023   • MMR 02/22/2017, 03/22/2017   • MMRV 03/22/2017   • Meningococcal Conjugate (MCV4O) 02/22/2017   • Meningococcal MCV4, Unspecified 02/22/2017, 10/12/2020   • Meningococcal MCV4P 02/22/2017, 10/12/2020   • TD (adult) Preservative Free 03/22/2017   • Td (adult), adsorbed 03/22/2017   • Tdap 02/22/2017, 11/04/2017   • Varicella 02/22/2017, 03/22/2017     Objective   {Disappearing Hyperlinks   Review Vitals * Enter New Vitals * Results Review * Labs * Imaging * Cardiology * Procedures * Lung Cancer Screening :25237}  /78 (BP Location: Left arm, Patient Position: Sitting, Cuff Size: Large)   Pulse (!) 112   Temp 99.6 °F (37.6 °C) (Tympanic)   Resp 16   Ht 5' 2\" (1.575 m)   Wt 85.1 kg (187 lb 9.6 oz)   SpO2 98%   BMI 34.31 kg/m²     Physical Exam  Vitals and nursing note reviewed.   Constitutional:       Appearance: Normal appearance. She is well-developed.   HENT:      Head: Normocephalic and atraumatic.      Right Ear: External ear normal.      Left Ear: External ear normal.   Eyes:      Conjunctiva/sclera: Conjunctivae normal.   Cardiovascular:      Rate and Rhythm: Normal rate and regular rhythm.      Heart sounds: Normal heart sounds.   Pulmonary:      Effort: Pulmonary effort is normal. "      Breath sounds: Normal breath sounds.   Musculoskeletal:         General: Normal range of motion.      Cervical back: Normal range of motion.   Skin:     General: Skin is warm and dry.   Neurological:      Mental Status: She is alert and oriented to person, place, and time.   Psychiatric:         Mood and Affect: Mood normal.         Behavior: Behavior normal.       Administrative Statements {Disappearing Hyperlinks I  Level of Service * [facility] of hospitals in [place]:18439}

## 2024-06-30 DIAGNOSIS — F32.A DEPRESSION, UNSPECIFIED DEPRESSION TYPE: ICD-10-CM

## 2024-07-01 DIAGNOSIS — E03.9 ACQUIRED HYPOTHYROIDISM: ICD-10-CM

## 2024-07-01 DIAGNOSIS — E66.09 CLASS 1 OBESITY DUE TO EXCESS CALORIES WITH SERIOUS COMORBIDITY AND BODY MASS INDEX (BMI) OF 34.0 TO 34.9 IN ADULT: ICD-10-CM

## 2024-07-01 RX ORDER — ESCITALOPRAM OXALATE 20 MG/1
20 TABLET ORAL DAILY
Qty: 90 TABLET | Refills: 0 | Status: SHIPPED | OUTPATIENT
Start: 2024-07-01

## 2024-07-01 RX ORDER — LEVOTHYROXINE SODIUM 137 UG/1
150 TABLET ORAL DAILY
Qty: 90 TABLET | Refills: 1 | Status: SHIPPED | OUTPATIENT
Start: 2024-07-01

## 2024-07-01 RX ORDER — SEMAGLUTIDE 0.5 MG/.5ML
INJECTION, SOLUTION SUBCUTANEOUS
Qty: 2 ML | Refills: 0 | Status: SHIPPED | OUTPATIENT
Start: 2024-07-01 | End: 2024-07-05 | Stop reason: SDUPTHER

## 2024-07-01 NOTE — RESULT ENCOUNTER NOTE
Please let her know that I have her thyroid test results. The 150mcg dose of levothyroxine is too high for her. She needs to return to the 137mcg dose. Please lt me know if she needs a ne prescription. Thank you

## 2024-07-05 DIAGNOSIS — E66.09 CLASS 1 OBESITY DUE TO EXCESS CALORIES WITH SERIOUS COMORBIDITY AND BODY MASS INDEX (BMI) OF 34.0 TO 34.9 IN ADULT: ICD-10-CM

## 2024-07-05 RX ORDER — SEMAGLUTIDE 0.5 MG/.5ML
0.5 INJECTION, SOLUTION SUBCUTANEOUS WEEKLY
Qty: 2 ML | Refills: 1 | Status: SHIPPED | OUTPATIENT
Start: 2024-07-05 | End: 2024-07-09 | Stop reason: SDUPTHER

## 2024-07-05 NOTE — TELEPHONE ENCOUNTER
Reason for call:   [x] Refill   [] Prior Auth  [x] Other: Pt called states her pcp sent her wegovy into the pharmacy this week but her pharmacy states they never received it. Checked and her pcp did sent it. So told pt I would resent it again.    Office:   [x] PCP/Provider -   Team Member Role and Specialty Contact Info Address Start End Comments   ADRIAN [facility] (Nurse Practitioner) Phone: [phone]  Fax: [phone] [address] [address] 8/23/2022 - -     [] Specialty/Provider -

## 2024-07-09 ENCOUNTER — TELEPHONE (OUTPATIENT)
Age: 19
End: 2024-07-09

## 2024-07-09 DIAGNOSIS — E66.09 CLASS 1 OBESITY DUE TO EXCESS CALORIES WITH SERIOUS COMORBIDITY AND BODY MASS INDEX (BMI) OF 34.0 TO 34.9 IN ADULT: ICD-10-CM

## 2024-07-09 RX ORDER — SEMAGLUTIDE 0.5 MG/.5ML
0.5 INJECTION, SOLUTION SUBCUTANEOUS WEEKLY
Qty: 2 ML | Refills: 0 | Status: CANCELLED | OUTPATIENT
Start: 2024-07-09

## 2024-07-09 RX ORDER — SEMAGLUTIDE 0.5 MG/.5ML
0.5 INJECTION, SOLUTION SUBCUTANEOUS WEEKLY
Qty: 2 ML | Refills: 1 | Status: SHIPPED | OUTPATIENT
Start: 2024-07-09

## 2024-07-09 NOTE — TELEPHONE ENCOUNTER
Patient states they have in stock at [facility]. Please resend there.    Reason for call:   [x] Refill   [] Prior Auth  [] Other:     Office:   [x] PCP/Provider -   [] Specialty/Provider -     Medication: Wegovy     Dose/Frequency: 0.5 MG/0.5 mL. Inject 0.5 mL under the skin once a week     Quantity: 2 mL     Pharmacy:   [facility]  [place] [address] [phone]     Does the patient have enough for 3 days?   [] Yes   [x] No - Send as HP to POD

## 2024-07-09 NOTE — TELEPHONE ENCOUNTER
Pt called advising that [facility] pharmacy states that Wegovy 0.5 is on a National backorder and they are not getting this medication at this time.  Pt would like to know what she should do at this time.    She is looking to call other pharmacies to if they have it. In case she can not get the medication she is asking for a call at [phone]

## 2024-07-09 NOTE — TELEPHONE ENCOUNTER
[facility] does not have Wegovy 0.5mg, can you please resend it to [facility] pharmacy since they have it in stock there

## 2024-07-22 ENCOUNTER — SOCIAL WORK (OUTPATIENT)
Dept: BEHAVIORAL/MENTAL HEALTH CLINIC | Facility: CLINIC | Age: 19
End: 2024-07-22
Payer: COMMERCIAL

## 2024-07-22 DIAGNOSIS — F32.A DEPRESSION, UNSPECIFIED DEPRESSION TYPE: Primary | ICD-10-CM

## 2024-07-22 DIAGNOSIS — F41.9 ANXIETY: ICD-10-CM

## 2024-07-22 PROCEDURE — 90834 PSYTX W PT 45 MINUTES: CPT
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
## 2024-07-26 ENCOUNTER — OFFICE VISIT (OUTPATIENT)
Dept: FAMILY MEDICINE CLINIC | Facility: CLINIC | Age: 19
End: 2024-07-26
Payer: COMMERCIAL

## 2024-07-26 VITALS
BODY MASS INDEX: 34.69 KG/M2 | RESPIRATION RATE: 16 BRPM | HEART RATE: 81 BPM | TEMPERATURE: 97.4 F | HEIGHT: 62 IN | WEIGHT: 188.5 LBS | OXYGEN SATURATION: 99 % | DIASTOLIC BLOOD PRESSURE: 78 MMHG | SYSTOLIC BLOOD PRESSURE: 118 MMHG

## 2024-07-26 DIAGNOSIS — E66.09 CLASS 1 OBESITY DUE TO EXCESS CALORIES WITH SERIOUS COMORBIDITY AND BODY MASS INDEX (BMI) OF 34.0 TO 34.9 IN ADULT: Primary | ICD-10-CM

## 2024-07-26 DIAGNOSIS — F41.9 ANXIETY: ICD-10-CM

## 2024-07-26 DIAGNOSIS — D50.8 OTHER IRON DEFICIENCY ANEMIA: ICD-10-CM

## 2024-07-26 DIAGNOSIS — F32.A DEPRESSION, UNSPECIFIED DEPRESSION TYPE: ICD-10-CM

## 2024-07-26 DIAGNOSIS — E03.9 ACQUIRED HYPOTHYROIDISM: ICD-10-CM

## 2024-07-26 PROCEDURE — 99214 OFFICE O/P EST MOD 30 MIN: CPT | Performed by: NURSE PRACTITIONER

## 2024-07-26 RX ORDER — FERROUS SULFATE 324(65)MG
324 TABLET, DELAYED RELEASE (ENTERIC COATED) ORAL
Qty: 180 TABLET | Refills: 1 | Status: SHIPPED | OUTPATIENT
Start: 2024-07-26 | End: 2024-10-24

## 2024-07-26 NOTE — ASSESSMENT & PLAN NOTE
- Stable.  - Continue ferrous sulfate with Vitamin C.   - Will obtain updated CBC and iron panel.

## 2024-07-26 NOTE — ASSESSMENT & PLAN NOTE
- Levothyroxine was recently decreased to 137 mcg daily by her Endocrinologist.   - Will continue to monitor TSH and free T4.

## 2024-07-26 NOTE — PROGRESS NOTES
Ambulatory Visit  Name: Marquita Haynes      : 2005      MRN: [id number]  Encounter Provider: ADRIAN Beckford  Encounter Date: 2024   Encounter department: [facility] [address] [facility]    Assessment & Plan   1. Class 1 obesity due to excess calories with serious comorbidity and body mass index (BMI) of 34.0 to 34.9 in adult  Assessment & Plan:  - Up 1 pound since last visit .  - Will increase Wegovy to 1 mg weekly.  Discussed side effects.  - Encourage healthy diet and regular exercise.  - Recommend follow-up in 1 month.  Orders:  -     Semaglutide-Weight Management (WEGOVY) 1 MG/0.5ML; Inject 0.5 mL (1 mg total) under the skin once a week for 4 doses  2. Other iron deficiency anemia  Assessment & Plan:  - Stable.  - Continue ferrous sulfate with Vitamin C.   - Will obtain updated CBC and iron panel.   Orders:  -     ferrous sulfate 324 (65 Fe) mg; Take 1 tablet (324 mg total) by mouth 2 (two) times a day before meals  -     CBC and differential; Future  -     Iron Panel (Includes Ferritin, Iron Sat%, Iron, and TIBC); Future  3. Depression, unspecified depression type  Assessment & Plan:  - Stable on Lexapro to 20 mg daily. Continue same.  - She is on wait list for talk therapy.   - Will continue to monitor.   4. Anxiety  Assessment & Plan:  - Stable on Lexapro to 20 mg daily. Continue same.  - Hydroxyzine as needed.  - She is on wait list for talk therapy.   - Will continue to monitor.   5. Acquired hypothyroidism  Assessment & Plan:  - Levothyroxine was recently decreased to 137 mcg daily by her Endocrinologist.   - Will continue to monitor TSH and free T4.         History of Present Illness     Patient presents to office today for 1 month follow up. She is currently on Wegovy 0.5 mg weekly. She is up 1 pound since her last visit. Denies any side effects from the medication. Her levothyroxine was recently adjusted by her Endocrinologist. She is due for updated CBC and iron panel to  monitor her anemia. She denies any concerns or complaints today.        Review of Systems   Constitutional:  Negative for fatigue and fever.   HENT:  Negative for trouble swallowing.    Eyes:  Negative for visual disturbance.   Respiratory:  Negative for cough and shortness of breath.    Cardiovascular:  Negative for chest pain and palpitations.   Gastrointestinal:  Negative for abdominal pain and blood in stool.   Endocrine: Negative for cold intolerance and heat intolerance.   Genitourinary:  Negative for difficulty urinating and dysuria.   Musculoskeletal:  Negative for gait problem.   Skin:  Negative for rash.   Neurological:  Negative for dizziness, syncope and headaches.   Hematological:  Negative for adenopathy.   Psychiatric/Behavioral:  Negative for behavioral problems.      Past Medical History:   Diagnosis Date   • Anemia    • BMI 35.0-35.9,adult    • Depression    • Hypothyroidism      Past Surgical History:   Procedure Laterality Date   • NO PAST SURGERIES       Family History   Problem Relation Age of Onset   • Diabetes Mother    • No Known Problems Father    • Hypertension Maternal Grandmother    • Breast cancer Neg Hx    • Colon cancer Neg Hx    • Ovarian cancer Neg Hx    • Uterine cancer Neg Hx      Social History     Tobacco Use   • Smoking status: Never   • Smokeless tobacco: Never   Vaping Use   • Vaping status: Never Used   Substance and Sexual Activity   • Alcohol use: No   • Drug use: No   • Sexual activity: Never     Current Outpatient Medications on File Prior to Visit   Medication Sig   • escitalopram (LEXAPRO) 20 mg tablet TAKE 1 TABLET BY MOUTH EVERY DAY   • hydrOXYzine HCL (ATARAX) 10 mg tablet Take 1 tablet (10 mg total) by mouth every 6 (six) hours as needed for anxiety   • levothyroxine 137 mcg tablet Take 1 tablet (137 mcg total) by mouth daily   • metFORMIN (GLUCOPHAGE-XR) 500 mg 24 hr tablet One pill by mouth once daily   • norgestimate-ethinyl estradiol (Tri-Lo-Monique)  "0.18/0.215/0.25 MG-25 MCG per tablet Take 1 tablet by mouth daily   • ondansetron (ZOFRAN) 4 mg tablet Take 1 tablet (4 mg total) by mouth every 8 (eight) hours as needed for nausea or vomiting   • [DISCONTINUED] Semaglutide-Weight Management (Wegovy) 0.5 MG/0.5ML Inject 0.5 mL (0.5 mg total) under the skin once a week   • ascorbic acid (VITAMIN C) 500 MG TBCR Take 1 tablet (500 mg total) by mouth daily   • [DISCONTINUED] ferrous sulfate 324 (65 Fe) mg Take 1 tablet (324 mg total) by mouth 2 (two) times a day before meals     Allergies   Allergen Reactions   • No Active Allergies      Immunization History   Administered Date(s) Administered   • COVID-19 PFIZER VACCINE 0.3 ML IM 08/26/2022   • COVID-19 Pfizer vac (Mario-sucrose, gray cap) 12 yr+ IM 08/26/2022, 03/30/2023   • HPV 06/23/2017   • HPV9 03/22/2017, 06/23/2017, 11/01/2017   • Hep A, ped/adol, 2 dose 11/01/2017   • Hep B, Adolescent or Pediatric 02/22/2017, 03/22/2017, 06/23/2017   • Hepatitis A 03/22/2017, 11/01/2017   • INFLUENZA 10/12/2018, 01/20/2020   • IPV 02/22/2017, 03/22/2017, 11/01/2017, 01/21/2020   • Influenza, injectable, quadrivalent, preservative free 0.5 mL 01/20/2020, 10/18/2023   • MMR 02/22/2017, 03/22/2017   • MMRV 03/22/2017   • Meningococcal Conjugate (MCV4O) 02/22/2017   • Meningococcal MCV4, Unspecified 02/22/2017, 10/12/2020   • Meningococcal MCV4P 02/22/2017, 10/12/2020   • TD (adult) Preservative Free 03/22/2017   • Td (adult), adsorbed 03/22/2017   • Tdap 02/22/2017, 11/04/2017   • Varicella 02/22/2017, 03/22/2017     Objective     /78 (BP Location: Left arm, Patient Position: Sitting, Cuff Size: Standard)   Pulse 81   Temp (!) 97.4 °F (36.3 °C) (Tympanic)   Resp 16   Ht 5' 2\" (1.575 m)   Wt 85.5 kg (188 lb 8 oz)   SpO2 99%   BMI 34.48 kg/m²     Physical Exam  Vitals and nursing note reviewed.   Constitutional:       Appearance: Normal appearance. She is well-developed.   HENT:      Head: Normocephalic and atraumatic. "      Right Ear: External ear normal.      Left Ear: External ear normal.   Eyes:      Conjunctiva/sclera: Conjunctivae normal.   Cardiovascular:      Rate and Rhythm: Normal rate and regular rhythm.      Heart sounds: Normal heart sounds.   Pulmonary:      Effort: Pulmonary effort is normal.      Breath sounds: Normal breath sounds.   Musculoskeletal:         General: Normal range of motion.      Cervical back: Normal range of motion.   Skin:     General: Skin is warm and dry.   Neurological:      Mental Status: She is alert and oriented to person, place, and time.   Psychiatric:         Mood and Affect: Mood normal.         Behavior: Behavior normal.

## 2024-07-26 NOTE — ASSESSMENT & PLAN NOTE
- Up 1 pound since last visit .  - Will increase Wegovy to 1 mg weekly.  Discussed side effects.  - Encourage healthy diet and regular exercise.  - Recommend follow-up in 1 month.

## 2024-08-01 ENCOUNTER — APPOINTMENT (OUTPATIENT)
Dept: LAB | Age: 19
End: 2024-08-01
Payer: COMMERCIAL

## 2024-08-01 ENCOUNTER — SOCIAL WORK (OUTPATIENT)
Dept: BEHAVIORAL/MENTAL HEALTH CLINIC | Facility: CLINIC | Age: 19
End: 2024-08-01
Payer: COMMERCIAL

## 2024-08-01 DIAGNOSIS — F32.A DEPRESSION, UNSPECIFIED DEPRESSION TYPE: Primary | ICD-10-CM

## 2024-08-01 DIAGNOSIS — D50.8 OTHER IRON DEFICIENCY ANEMIA: ICD-10-CM

## 2024-08-01 DIAGNOSIS — F41.9 ANXIETY: ICD-10-CM

## 2024-08-01 LAB
BASOPHILS # BLD AUTO: 0.04 THOUSANDS/ÂΜL (ref 0–0.1)
BASOPHILS NFR BLD AUTO: 0 % (ref 0–1)
EOSINOPHIL # BLD AUTO: 0.08 THOUSAND/ÂΜL (ref 0–0.61)
EOSINOPHIL NFR BLD AUTO: 1 % (ref 0–6)
ERYTHROCYTE [DISTWIDTH] IN BLOOD BY AUTOMATED COUNT: 14.1 % (ref 11.6–15.1)
FERRITIN SERPL-MCNC: 28 NG/ML (ref 11–307)
HCT VFR BLD AUTO: 40.1 % (ref 34.8–46.1)
HGB BLD-MCNC: 12.4 G/DL (ref 11.5–15.4)
IMM GRANULOCYTES # BLD AUTO: 0.02 THOUSAND/UL (ref 0–0.2)
IMM GRANULOCYTES NFR BLD AUTO: 0 % (ref 0–2)
IRON SATN MFR SERPL: 15 % (ref 15–50)
IRON SERPL-MCNC: 55 UG/DL (ref 50–212)
LYMPHOCYTES # BLD AUTO: 2.76 THOUSANDS/ÂΜL (ref 0.6–4.47)
LYMPHOCYTES NFR BLD AUTO: 28 % (ref 14–44)
MCH RBC QN AUTO: 24.3 PG (ref 26.8–34.3)
MCHC RBC AUTO-ENTMCNC: 30.9 G/DL (ref 31.4–37.4)
MCV RBC AUTO: 79 FL (ref 82–98)
MONOCYTES # BLD AUTO: 0.57 THOUSAND/ÂΜL (ref 0.17–1.22)
MONOCYTES NFR BLD AUTO: 6 % (ref 4–12)
NEUTROPHILS # BLD AUTO: 6.33 THOUSANDS/ÂΜL (ref 1.85–7.62)
NEUTS SEG NFR BLD AUTO: 65 % (ref 43–75)
NRBC BLD AUTO-RTO: 0 /100 WBCS
PLATELET # BLD AUTO: 383 THOUSANDS/UL (ref 149–390)
PMV BLD AUTO: 10.8 FL (ref 8.9–12.7)
RBC # BLD AUTO: 5.11 MILLION/UL (ref 3.81–5.12)
TIBC SERPL-MCNC: 360 UG/DL (ref 250–450)
UIBC SERPL-MCNC: 305 UG/DL (ref 155–355)
WBC # BLD AUTO: 9.8 THOUSAND/UL (ref 4.31–10.16)

## 2024-08-01 PROCEDURE — 85025 COMPLETE CBC W/AUTO DIFF WBC: CPT

## 2024-08-01 PROCEDURE — 83550 IRON BINDING TEST: CPT

## 2024-08-01 PROCEDURE — 82728 ASSAY OF FERRITIN: CPT

## 2024-08-01 PROCEDURE — 36415 COLL VENOUS BLD VENIPUNCTURE: CPT

## 2024-08-01 PROCEDURE — 83540 ASSAY OF IRON: CPT

## 2024-08-01 PROCEDURE — 90834 PSYTX W PT 45 MINUTES: CPT

## 2024-08-01 NOTE — PSYCH
"Behavioral Health Psychotherapy Progress Note    Psychotherapy Provided: Individual Psychotherapy     1. Depression, unspecified depression type        2. Anxiety            Goals addressed in session: Goal 1 and Goal 2     DATA: Marquita joined session in person. She actively participated in the initial creation of her treatment plan and provided written consent same day. Marquita discussed with clinician goals she wanted to work on that consisted of improving procrastination and self esteem. Marquita also worked with clinician to complete her initial safety/crisis plan and provided written consent as well.   During this session, this clinician used the following therapeutic modalities: Engagement Strategies    Substance Abuse was not addressed during this session. If the client is diagnosed with a co-occurring substance use disorder, please indicate any changes in the frequency or amount of use: N/A. Stage of change for addressing substance use diagnoses: No substance use/Not applicable    ASSESSMENT:  Marquita Haynes presents with a Euthymic/ normal mood,  her affect is Normal range and intensity, which is congruent, with her mood and the content of the session. The client has not made progress on their goals. Marquita Haynes presents with a minimal risk of suicide, minimal risk of self-harm, and minimal risk of harm to others.    For any risk assessment that surpasses a \"low\" rating, a safety plan must be developed.    A safety plan was indicated: no  If yes, describe in detail Initial safety plan created    PLAN: Between sessions, Marquita Haynes will continue working on treatment plan goals and follow up with clinician. At the next session, the therapist will use Client-centered Therapy and Cognitive Behavioral Therapy to address confidence, anxiety, and depression.    Behavioral Health Treatment Plan and Discharge Planning: Marquita Haynes is aware of and agrees to continue to work on their treatment plan. They have " identified and are working toward their discharge goals. yes    Visit start and stop times:    08/01/24  Start Time: 1000  Stop Time: 1050  Total Visit Time: 50 minutes

## 2024-08-01 NOTE — BH CRISIS PLAN
Client Name: Marquita Haynes       Client YOB: 2005    [facility] Safety Plan      Creation Date: 8/1/24 Update Date: 8/1/25   Created By: Caitlin Kramer       Step 1: Warning Signs:   Warning Signs   Loss of energy   Negative thoughts   Suicidal ideations            Step 2: Internal Coping Strategies:   Internal Coping Strategies   Watch my tv shows (Supernatural)            Step 3: People and social settings that provide distraction:   Name Contact Information   None Reported N/A    Places   N/A           Step 4: People whom I can ask for help during a crisis:     Patient did not identify any contacts: Yes      Step 5: Professionals or agencies I can contact during a crisis:      Clinican/Agency Name Phone Emergency Contact    Caitlin Kramer [phone] [facility] Mental Health Professional      [facility] Emergency Department Emergency Department Phone Emergency Department Address    [facility] [phone] [address] [place] [address]        Crisis Phone Numbers:   Suicide Prevention Lifeline: Call or Text  988 Crisis Text Line: Text HOME to 115-163   Please note: Some [facility] do not have a separate number for Child/Adolescent specific crisis. If your [facility] is not listed under Child/Adolescent, please call the adult number for your county      Adult Crisis Numbers: Child/Adolescent Crisis Numbers   [facility]: [phone] [facility]: [phone]   [facility]: [phone] [facility]: [phone]   [facility]: [phone] [place]: [phone]   [facility]: [phone] [place]: [phone]   [place]: [phone]   [facility]: [phone]   [facility]: [phone]   [facility]: [phone] (daytime) [phone] (after hours, weekends, holidays)      Step 6: Making the environment safer (plan for lethal means safety):   Patient did not identify any lethal  methods: Yes     Optional: What is most important to me and worth living for?   My mom?      [facility]h Safety Plan. Frieda Foley and Hugo Laura. Used with permission of the authors.

## 2024-08-01 NOTE — BH TREATMENT PLAN
"Outpatient Behavioral Health Psychotherapy Treatment Plan    Marquita Haynes  2005     Date of Initial Psychotherapy Assessment: 7/22/2024   Date of Current Treatment Plan: 08/01/24  Treatment Plan Target Date: 1/28/2025  Treatment Plan Expiration Date: 1/28/2025    Diagnosis:   1. Depression, unspecified depression type        2. Anxiety            Area(s) of Need: Time management, Self-Esteem    Long Term Goal 1 (in the client's own words): \" Fix my procrastination issues\"    Stage of Change: Contemplation    Target Date for completion: TBD     Anticipated therapeutic modalities: Cognitive Behavioral Therapy and Client-Centered Therapy     People identified to complete this goal: Marquita and this clinician are responsible for this treatment goal      Objective 1: (identify the means of measuring success in meeting the objective): Clinician will work with Marquita to improve time management and organizational skills in order to minimize procrastination.      Objective 2: (identify the means of measuring success in meeting the objective): Marquita will work to expand her intrinsic motivation for tasks in order to improve completion.      Long Term Goal 2 (in the client's own words): \" I would like to work on my self- confidence\"    Stage of Change: Contemplation    Target Date for completion: TBD     Anticipated therapeutic modalities: Cognitive Behavioral Therapy and Client-Centered Therapy     People identified to complete this goal: Marquita and this clinician are responsible for this treatment goal      Objective 1: (identify the means of measuring success in meeting the objective): Marquita will work to minimize negative self-talk and work on challenging and reframing them.      Objective 2: (identify the means of measuring success in meeting the objective): Marquita  will work to find a healthy balance between task completion and achievements and enhancing her confidence with items completed.      I am currently under the " "care of a [facility] psychiatric provider: no    My [facility] psychiatric provider is: N/A Prescribed by PCP    I am currently taking psychiatric medications: Yes, as prescribed    I feel that I will be ready for discharge from mental health care when I reach the following (measurable goal/objective): \" Life would be better if I work on my goals throughout session and it wouldn't be as gloomy and if I fall into my old habits I will be able to get myself out of it\".    For children and adults who have a legal guardian:   Has there been any change to custody orders and/or guardianship status? No. If yes, attach updated documentation.    I have created my Crisis Plan and have been offered a copy of this plan    Behavioral Health Treatment Plan [facility]: Diagnosis and Treatment Plan explained to Marquita Haynes acknowledges an understanding of their diagnosis. Marquita Haynes agrees to this treatment plan.    I have been offered a copy of this Treatment Plan. yes        "

## 2024-08-06 PROBLEM — S97.112A: Status: RESOLVED | Noted: 2020-11-16 | Resolved: 2024-08-06

## 2024-08-14 ENCOUNTER — SOCIAL WORK (OUTPATIENT)
Dept: BEHAVIORAL/MENTAL HEALTH CLINIC | Facility: CLINIC | Age: 19
End: 2024-08-14
Payer: COMMERCIAL

## 2024-08-14 DIAGNOSIS — F41.9 ANXIETY: ICD-10-CM

## 2024-08-14 DIAGNOSIS — F32.A DEPRESSION, UNSPECIFIED DEPRESSION TYPE: Primary | ICD-10-CM

## 2024-08-14 PROCEDURE — 90834 PSYTX W PT 45 MINUTES: CPT

## 2024-08-14 NOTE — PSYCH
"Behavioral Health Psychotherapy Progress Note    Psychotherapy Provided: Individual Psychotherapy     1. Depression, unspecified depression type        2. Anxiety            Goals addressed in session: Goal 1 and Goal 2     DATA: Marquita joined her individual session in person and arrived late to her appointment. She discussed with clinician difficulties surrounding procrastination and returning back to school. She notes a \" pattern when stressed\" stating it involves \"crying and self-destructing and giving up\". She also reported having a best friend who she heavily relied on and noted she was starting to see her as her father which caused her to distance herself. She reports her friendship reminds her of her insecurities and her friend is \"dominating the relationship\". Clinician assisted Marquita in practicing assertiveness and expressing feelings to her friend instead of avoiding.  During this session, this clinician used the following therapeutic modalities: Client-centered Therapy and Cognitive Behavioral Therapy    Substance Abuse was not addressed during this session. If the client is diagnosed with a co-occurring substance use disorder, please indicate any changes in the frequency or amount of use: N/A. Stage of change for addressing substance use diagnoses: No substance use/Not applicable    ASSESSMENT:  Marquita Haynes presents with a Euthymic/ normal mood, her affect is Normal range and intensity, which is congruent, with her mood and the content of the session. The client has made progress on their goals. Marquita Haynes presents with a minimal risk of suicide, minimal risk of self-harm, and minimal risk of harm to others.    For any risk assessment that surpasses a \"low\" rating, a safety plan must be developed.    A safety plan was indicated: no  If yes, describe in detail N/A    PLAN: Between sessions, Marquita Haynes will practice assertiveness and expressing her emotions to her friend. At the next session, the " therapist will use Client-centered Therapy and Cognitive Behavioral Therapy to address anxiety and depression.    Behavioral Health Treatment Plan and Discharge Planning: Marquita Haynes is aware of and agrees to continue to work on their treatment plan. They have identified and are working toward their discharge goals. yes    Visit start and stop times:    08/14/24  Start Time: 0815  Stop Time: 0858  Total Visit Time: 43 minutes

## 2024-08-27 ENCOUNTER — OFFICE VISIT (OUTPATIENT)
Dept: FAMILY MEDICINE CLINIC | Facility: CLINIC | Age: 19
End: 2024-08-27
Payer: COMMERCIAL

## 2024-08-27 VITALS
OXYGEN SATURATION: 97 % | SYSTOLIC BLOOD PRESSURE: 118 MMHG | DIASTOLIC BLOOD PRESSURE: 80 MMHG | TEMPERATURE: 98.3 F | HEART RATE: 82 BPM | HEIGHT: 62 IN | WEIGHT: 186 LBS | BODY MASS INDEX: 34.23 KG/M2 | RESPIRATION RATE: 16 BRPM

## 2024-08-27 DIAGNOSIS — E66.09 CLASS 1 OBESITY DUE TO EXCESS CALORIES WITH SERIOUS COMORBIDITY AND BODY MASS INDEX (BMI) OF 34.0 TO 34.9 IN ADULT: Primary | ICD-10-CM

## 2024-08-27 PROCEDURE — 99213 OFFICE O/P EST LOW 20 MIN: CPT | Performed by: NURSE PRACTITIONER

## 2024-08-27 NOTE — ASSESSMENT & PLAN NOTE
- Down 2 pounds since last visit .  - Will increase Wegovy to 1 mg weekly.  Discussed side effects.  - Encourage healthy diet and regular exercise.  - Recommend follow-up in 1 month.

## 2024-08-27 NOTE — PROGRESS NOTES
Ambulatory Visit  Name: Marquita Haynes      : 2005      MRN: [id number]  Encounter Provider: ADRIAN Beckford  Encounter Date: 2024   Encounter department: [facility] [address] [facility]    Assessment & Plan   1. Class 1 obesity due to excess calories with serious comorbidity and body mass index (BMI) of 34.0 to 34.9 in adult  Assessment & Plan:  - Down 2 pounds since last visit .  - Will increase Wegovy to 1 mg weekly.  Discussed side effects.  - Encourage healthy diet and regular exercise.  - Recommend follow-up in 1 month.  Orders:  -     Semaglutide-Weight Management (WEGOVY) 1 MG/0.5ML; Inject 0.5 mL (1 mg total) under the skin once a week for 4 doses       History of Present Illness     Patient presents to office today for 1 month follow up. She is taking Wegovy for weight loss. Has been without the medication for 3 weeks because she couldn't find it in stock at local pharmacies. She is still down 2 pounds since last visit. Denies any other concerns or complaints today.        Review of Systems   Constitutional:  Negative for fatigue and fever.   HENT:  Negative for trouble swallowing.    Eyes:  Negative for visual disturbance.   Respiratory:  Negative for cough and shortness of breath.    Cardiovascular:  Negative for chest pain and palpitations.   Gastrointestinal:  Negative for abdominal pain and blood in stool.   Endocrine: Negative for cold intolerance and heat intolerance.   Genitourinary:  Negative for difficulty urinating and dysuria.   Musculoskeletal:  Negative for gait problem.   Skin:  Negative for rash.   Neurological:  Negative for dizziness, syncope and headaches.   Hematological:  Negative for adenopathy.   Psychiatric/Behavioral:  Negative for behavioral problems.      Past Medical History:   Diagnosis Date   • Anemia    • BMI 35.0-35.9,adult    • Depression    • Hypothyroidism      Past Surgical History:   Procedure Laterality Date   • NO PAST SURGERIES        Family History   Problem Relation Age of Onset   • Diabetes Mother    • No Known Problems Father    • Hypertension Maternal Grandmother    • Breast cancer Neg Hx    • Colon cancer Neg Hx    • Ovarian cancer Neg Hx    • Uterine cancer Neg Hx      Social History     Tobacco Use   • Smoking status: Never   • Smokeless tobacco: Never   Vaping Use   • Vaping status: Never Used   Substance and Sexual Activity   • Alcohol use: No   • Drug use: No   • Sexual activity: Never     Current Outpatient Medications on File Prior to Visit   Medication Sig   • escitalopram (LEXAPRO) 20 mg tablet TAKE 1 TABLET BY MOUTH EVERY DAY   • ferrous sulfate 324 (65 Fe) mg Take 1 tablet (324 mg total) by mouth 2 (two) times a day before meals   • hydrOXYzine HCL (ATARAX) 10 mg tablet Take 1 tablet (10 mg total) by mouth every 6 (six) hours as needed for anxiety   • levothyroxine 137 mcg tablet Take 1 tablet (137 mcg total) by mouth daily   • metFORMIN (GLUCOPHAGE-XR) 500 mg 24 hr tablet One pill by mouth once daily   • norgestimate-ethinyl estradiol (Tri-Lo-Monique) 0.18/0.215/0.25 MG-25 MCG per tablet Take 1 tablet by mouth daily   • ondansetron (ZOFRAN) 4 mg tablet Take 1 tablet (4 mg total) by mouth every 8 (eight) hours as needed for nausea or vomiting   • ascorbic acid (VITAMIN C) 500 MG TBCR Take 1 tablet (500 mg total) by mouth daily     Allergies   Allergen Reactions   • No Active Allergies      Immunization History   Administered Date(s) Administered   • COVID-19 PFIZER VACCINE 0.3 ML IM 08/26/2022   • COVID-19 Pfizer vac (Mario-sucrose, gray cap) 12 yr+ IM 08/26/2022, 03/30/2023   • HPV 06/23/2017   • HPV9 03/22/2017, 06/23/2017, 11/01/2017   • Hep A, ped/adol, 2 dose 11/01/2017   • Hep B, Adolescent or Pediatric 02/22/2017, 03/22/2017, 06/23/2017   • Hepatitis A 03/22/2017, 11/01/2017   • INFLUENZA 10/12/2018, 01/20/2020   • IPV 02/22/2017, 03/22/2017, 11/01/2017, 01/21/2020   • Influenza, injectable, quadrivalent, preservative  "free 0.5 mL 01/20/2020, 10/18/2023   • MMR 02/22/2017, 03/22/2017   • MMRV 03/22/2017   • Meningococcal Conjugate (MCV4O) 02/22/2017   • Meningococcal MCV4, Unspecified 02/22/2017, 10/12/2020   • Meningococcal MCV4P 02/22/2017, 10/12/2020   • TD (adult) Preservative Free 03/22/2017   • Td (adult), adsorbed 03/22/2017   • Tdap 02/22/2017, 11/04/2017   • Varicella 02/22/2017, 03/22/2017     Objective     /80 (BP Location: Left arm, Patient Position: Sitting, Cuff Size: Large)   Pulse 82   Temp 98.3 °F (36.8 °C) (Tympanic)   Resp 16   Ht 5' 2\" (1.575 m)   Wt 84.4 kg (186 lb)   SpO2 97%   BMI 34.02 kg/m²     Physical Exam  Vitals and nursing note reviewed.   Constitutional:       Appearance: Normal appearance. She is well-developed.   HENT:      Head: Normocephalic and atraumatic.      Right Ear: External ear normal.      Left Ear: External ear normal.   Eyes:      Conjunctiva/sclera: Conjunctivae normal.   Cardiovascular:      Rate and Rhythm: Normal rate and regular rhythm.      Heart sounds: Normal heart sounds.   Pulmonary:      Effort: Pulmonary effort is normal.      Breath sounds: Normal breath sounds.   Musculoskeletal:         General: Normal range of motion.      Cervical back: Normal range of motion.   Skin:     General: Skin is warm and dry.   Neurological:      Mental Status: She is alert and oriented to person, place, and time.   Psychiatric:         Mood and Affect: Mood normal.         Behavior: Behavior normal.         "

## 2024-09-25 ENCOUNTER — SOCIAL WORK (OUTPATIENT)
Dept: BEHAVIORAL/MENTAL HEALTH CLINIC | Facility: CLINIC | Age: 19
End: 2024-09-25
Payer: COMMERCIAL

## 2024-09-25 DIAGNOSIS — F41.9 ANXIETY: ICD-10-CM

## 2024-09-25 DIAGNOSIS — F32.A DEPRESSION, UNSPECIFIED DEPRESSION TYPE: Primary | ICD-10-CM

## 2024-09-25 PROCEDURE — 90834 PSYTX W PT 45 MINUTES: CPT
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
## 2024-09-26 ENCOUNTER — OFFICE VISIT (OUTPATIENT)
Dept: FAMILY MEDICINE CLINIC | Facility: CLINIC | Age: 19
End: 2024-09-26
Payer: COMMERCIAL

## 2024-09-26 VITALS
BODY MASS INDEX: 34.3 KG/M2 | HEART RATE: 100 BPM | OXYGEN SATURATION: 98 % | RESPIRATION RATE: 16 BRPM | DIASTOLIC BLOOD PRESSURE: 80 MMHG | TEMPERATURE: 99 F | SYSTOLIC BLOOD PRESSURE: 118 MMHG | HEIGHT: 62 IN | WEIGHT: 186.4 LBS

## 2024-09-26 DIAGNOSIS — F32.A DEPRESSION, UNSPECIFIED DEPRESSION TYPE: ICD-10-CM

## 2024-09-26 DIAGNOSIS — E66.09 CLASS 1 OBESITY DUE TO EXCESS CALORIES WITH SERIOUS COMORBIDITY AND BODY MASS INDEX (BMI) OF 34.0 TO 34.9 IN ADULT: ICD-10-CM

## 2024-09-26 DIAGNOSIS — F41.9 ANXIETY: ICD-10-CM

## 2024-09-26 DIAGNOSIS — G47.09 OTHER INSOMNIA: Primary | ICD-10-CM

## 2024-09-26 DIAGNOSIS — E66.811 CLASS 1 OBESITY DUE TO EXCESS CALORIES WITH SERIOUS COMORBIDITY AND BODY MASS INDEX (BMI) OF 34.0 TO 34.9 IN ADULT: ICD-10-CM

## 2024-09-26 PROCEDURE — 99214 OFFICE O/P EST MOD 30 MIN: CPT | Performed by: NURSE PRACTITIONER

## 2024-09-26 RX ORDER — SEMAGLUTIDE 1.7 MG/.75ML
1.7 INJECTION, SOLUTION SUBCUTANEOUS WEEKLY
Qty: 3 ML | Refills: 0 | Status: SHIPPED | OUTPATIENT
Start: 2024-09-26 | End: 2024-10-18

## 2024-09-26 RX ORDER — HYDROXYZINE PAMOATE 25 MG/1
25 CAPSULE ORAL
Qty: 30 CAPSULE | Refills: 1 | Status: SHIPPED | OUTPATIENT
Start: 2024-09-26

## 2024-09-26 NOTE — ASSESSMENT & PLAN NOTE
- Weight stable since last visit.  - Will increase Wegovy to 1.7 mg weekly.  Discussed side effects.  - Encourage healthy diet and regular exercise.  - Recommend follow-up in 1 month.  Orders:    Semaglutide-Weight Management (Wegovy) 1.7 MG/0.75ML; Inject 0.75 mL (1.7 mg total) under the skin once a week for 4 doses Inject 1.7 mg under the skin weekly

## 2024-09-26 NOTE — LETTER
September 26, 2024     Patient: Marquita Haynes  YOB: 2005  Date of Visit: 9/26/2024      To Whom it May Concern:    Marquita Haynes is under my professional care. Marquita was seen in my office on 9/26/2024. Marquita was ill and could not make it to class. Please excuse her from class on 9/26/2024.    If you have any questions or concerns, please don't hesitate to call.         Sincerely,          ADRIAN Beckford        CC: No Recipients

## 2024-09-26 NOTE — PROGRESS NOTES
Ambulatory Visit  Name: Marquita Haynes      : 2005      MRN: [id number]  Encounter Provider: ADRIAN Beckford  Encounter Date: 2024   Encounter department: [facility]    Assessment & Plan  Other insomnia  - Not well controlled.  - Prescription sent for hydroxyzine 25 mg at bedtime as needed. Discussed side effects.  - Will continue to monitor.   Orders:    hydrOXYzine pamoate (VISTARIL) 25 mg capsule; Take 1 capsule (25 mg total) by mouth daily at bedtime as needed (sleep)    Class 1 obesity due to excess calories with serious comorbidity and body mass index (BMI) of 34.0 to 34.9 in adult  - Weight stable since last visit.  - Will increase Wegovy to 1.7 mg weekly.  Discussed side effects.  - Encourage healthy diet and regular exercise.  - Recommend follow-up in 1 month.  Orders:    Semaglutide-Weight Management (Wegovy) 1.7 MG/0.75ML; Inject 0.75 mL (1.7 mg total) under the skin once a week for 4 doses Inject 1.7 mg under the skin weekly    Depression, unspecified depression type  - Stable on Lexapro to 20 mg daily. Continue same.  - She is on wait list for talk therapy.   - Will continue to monitor.          Anxiety  - Stable on Lexapro to 20 mg daily. Continue same.  - She is on wait list for talk therapy.   - Will continue to monitor.               History of Present Illness     Patient presents to office today for 1 month follow up. She is taking Wegovy for weight loss. Her weight is the same as last month. She denies any side effects from the medication. She has complaints of trouble sleeping. States that she can't stay asleep. Has tried OTC melatonin with no improvement. Denies any other concerns or complaints today.        Review of Systems   Constitutional:  Negative for fatigue and fever.   HENT:  Negative for trouble swallowing.    Eyes:  Negative for visual disturbance.   Respiratory:  Negative for cough and shortness of breath.    Cardiovascular:   Negative for chest pain and palpitations.   Gastrointestinal:  Negative for abdominal pain and blood in stool.   Endocrine: Negative for cold intolerance and heat intolerance.   Genitourinary:  Negative for difficulty urinating and dysuria.   Musculoskeletal:  Negative for gait problem.   Skin:  Negative for rash.   Neurological:  Negative for dizziness, syncope and headaches.   Hematological:  Negative for adenopathy.   Psychiatric/Behavioral:  Positive for sleep disturbance. Negative for behavioral problems.      Past Medical History:   Diagnosis Date    Anemia     BMI 35.0-35.9,adult     Depression     Hypothyroidism      Past Surgical History:   Procedure Laterality Date    NO PAST SURGERIES       Family History   Problem Relation Age of Onset    Diabetes Mother     No Known Problems Father     Hypertension Maternal Grandmother     Breast cancer Neg Hx     Colon cancer Neg Hx     Ovarian cancer Neg Hx     Uterine cancer Neg Hx      Social History     Tobacco Use    Smoking status: Never    Smokeless tobacco: Never   Vaping Use    Vaping status: Never Used   Substance and Sexual Activity    Alcohol use: No    Drug use: No    Sexual activity: Never     Current Outpatient Medications on File Prior to Visit   Medication Sig    escitalopram (LEXAPRO) 20 mg tablet TAKE 1 TABLET BY MOUTH EVERY DAY    ferrous sulfate 324 (65 Fe) mg Take 1 tablet (324 mg total) by mouth 2 (two) times a day before meals    hydrOXYzine HCL (ATARAX) 10 mg tablet Take 1 tablet (10 mg total) by mouth every 6 (six) hours as needed for anxiety    levothyroxine 137 mcg tablet Take 1 tablet (137 mcg total) by mouth daily    metFORMIN (GLUCOPHAGE-XR) 500 mg 24 hr tablet One pill by mouth once daily    norgestimate-ethinyl estradiol (Tri-Lo-Monique) 0.18/0.215/0.25 MG-25 MCG per tablet Take 1 tablet by mouth daily    ondansetron (ZOFRAN) 4 mg tablet Take 1 tablet (4 mg total) by mouth every 8 (eight) hours as needed for nausea or vomiting     "ascorbic acid (VITAMIN C) 500 MG TBCR Take 1 tablet (500 mg total) by mouth daily     Allergies   Allergen Reactions    No Active Allergies      Immunization History   Administered Date(s) Administered    COVID-19 PFIZER VACCINE 0.3 ML IM 08/26/2022    COVID-19 Pfizer vac (Mario-sucrose, gray cap) 12 yr+ IM 08/26/2022, 03/30/2023    HPV 06/23/2017    HPV9 03/22/2017, 06/23/2017, 11/01/2017    Hep A, ped/adol, 2 dose 11/01/2017    Hep B, Adolescent or Pediatric 02/22/2017, 03/22/2017, 06/23/2017    Hepatitis A 03/22/2017, 11/01/2017    INFLUENZA 10/12/2018, 01/20/2020    IPV 02/22/2017, 03/22/2017, 11/01/2017, 01/21/2020    Influenza, injectable, quadrivalent, preservative free 0.5 mL 01/20/2020, 10/18/2023    MMR 02/22/2017, 03/22/2017    MMRV 03/22/2017    Meningococcal Conjugate (MCV4O) 02/22/2017    Meningococcal MCV4, Unspecified 02/22/2017, 10/12/2020    Meningococcal MCV4P 02/22/2017, 10/12/2020    TD (adult) Preservative Free 03/22/2017    Td (adult), adsorbed 03/22/2017    Tdap 02/22/2017, 11/04/2017    Varicella 02/22/2017, 03/22/2017     Objective     /80 (BP Location: Left arm, Patient Position: Sitting, Cuff Size: Large)   Pulse 100   Temp 99 °F (37.2 °C) (Tympanic)   Resp 16   Ht 5' 2\" (1.575 m)   Wt 84.6 kg (186 lb 6.4 oz)   SpO2 98%   BMI 34.09 kg/m²     Physical Exam  Vitals and nursing note reviewed.   Constitutional:       Appearance: Normal appearance. She is well-developed.   HENT:      Head: Normocephalic and atraumatic.      Right Ear: External ear normal.      Left Ear: External ear normal.   Eyes:      Conjunctiva/sclera: Conjunctivae normal.   Cardiovascular:      Rate and Rhythm: Normal rate and regular rhythm.      Heart sounds: Normal heart sounds.   Pulmonary:      Effort: Pulmonary effort is normal.      Breath sounds: Normal breath sounds.   Musculoskeletal:         General: Normal range of motion.      Cervical back: Normal range of motion.   Skin:     General: Skin is " warm and dry.   Neurological:      Mental Status: She is alert and oriented to person, place, and time.   Psychiatric:         Mood and Affect: Mood normal.         Behavior: Behavior normal.

## 2024-09-28 DIAGNOSIS — F32.A DEPRESSION, UNSPECIFIED DEPRESSION TYPE: ICD-10-CM

## 2024-09-28 RX ORDER — ESCITALOPRAM OXALATE 20 MG/1
20 TABLET ORAL DAILY
Qty: 90 TABLET | Refills: 0 | Status: SHIPPED | OUTPATIENT
Start: 2024-09-28

## 2024-10-16 ENCOUNTER — SOCIAL WORK (OUTPATIENT)
Dept: BEHAVIORAL/MENTAL HEALTH CLINIC | Facility: CLINIC | Age: 19
End: 2024-10-16
Payer: COMMERCIAL

## 2024-10-16 DIAGNOSIS — F41.9 ANXIETY: ICD-10-CM

## 2024-10-16 DIAGNOSIS — F32.A DEPRESSION, UNSPECIFIED DEPRESSION TYPE: Primary | ICD-10-CM

## 2024-10-16 PROCEDURE — 90837 PSYTX W PT 60 MINUTES: CPT

## 2024-10-16 NOTE — PSYCH
"Behavioral Health Psychotherapy Progress Note    Psychotherapy Provided: Individual Psychotherapy     1. Depression, unspecified depression type        2. Anxiety            Goals addressed in session: Goal 1 and Goal 2     DATA: Marquita joined session in person reporting she is doing well. She discussed with clinician her academics and difficulties with negative self talk. She stated she is \" working harder than I did before\" and does not let her academics consume her but  when I do other things I feel guilty if I'm not doing well in school\". She reports her standards for success are higher than normal and reported difficulties with procrastination. Clinician assisted Marquita in identifying negative belief patterns and self talk and worked to reframe them. Marquita stated \" I believe in my potential more than myself\". When analyzed she determined her \"fear of failure and not being content\" as a driving force to her avoidance. During this session, this clinician used the following therapeutic modalities: Cognitive Behavioral Therapy    Substance Abuse was not addressed during this session. If the client is diagnosed with a co-occurring substance use disorder, please indicate any changes in the frequency or amount of use: N/A. Stage of change for addressing substance use diagnoses: No substance use/Not applicable    ASSESSMENT:  Marquita Haynes presents with a Euthymic/ normal mood, her affect is Normal range and intensity, which is congruent, with her mood and the content of the session. The client has made progress on their goals. Marquita Haynes presents with a minimal risk of suicide, minimal risk of self-harm, and minimal risk of harm to others.    For any risk assessment that surpasses a \"low\" rating, a safety plan must be developed.    A safety plan was indicated: no  If yes, describe in detail N/A    PLAN: Between sessions, Marquita Haynes will work on utilizing positive self talk in place of negative self talk. At the " next session, the therapist will use Cognitive Behavioral Therapy and Supportive Psychotherapy to address anxiety and depression.    Behavioral Health Treatment Plan and Discharge Planning: Marquita Haynes is aware of and agrees to continue to work on their treatment plan. They have identified and are working toward their discharge goals. yes    Visit start and stop times:    10/16/24  Start Time: 1500  Stop Time: 1553  Total Visit Time: 53 minutes

## 2024-10-21 DIAGNOSIS — E28.2 PCOS (POLYCYSTIC OVARIAN SYNDROME): ICD-10-CM

## 2024-10-22 RX ORDER — METFORMIN HYDROCHLORIDE 500 MG/1
TABLET, EXTENDED RELEASE ORAL
Qty: 30 TABLET | Refills: 5 | Status: SHIPPED | OUTPATIENT
Start: 2024-10-22

## 2024-10-23 ENCOUNTER — TELEPHONE (OUTPATIENT)
Age: 19
End: 2024-10-23

## 2024-10-23 ENCOUNTER — SOCIAL WORK (OUTPATIENT)
Dept: BEHAVIORAL/MENTAL HEALTH CLINIC | Facility: CLINIC | Age: 19
End: 2024-10-23
Payer: COMMERCIAL

## 2024-10-23 DIAGNOSIS — F32.A DEPRESSION, UNSPECIFIED DEPRESSION TYPE: Primary | ICD-10-CM

## 2024-10-23 DIAGNOSIS — F41.9 ANXIETY: ICD-10-CM

## 2024-10-23 PROCEDURE — 90834 PSYTX W PT 45 MINUTES: CPT

## 2024-10-23 NOTE — PSYCH
"Behavioral Health Psychotherapy Progress Note    Psychotherapy Provided: Individual Psychotherapy     1. Depression, unspecified depression type        2. Anxiety            Goals addressed in session: Goal 1 and Goal 2     DATA: Carol joined session in person reporting she has been \"doing well but stressed\" due to an influx in college exams. She reported continued feelings as if she \" can be doing more and feels the constant need to be doing work because that what I associate success with\". Clinician and Carol worked through the various meanings and ideas for success that does not need so much added pressure. Clinician and Carol worked to develop a rewards based system within a schedule that allows for a healthy balance of self-care and academics. During this session, this clinician used the following therapeutic modalities: Client-centered Therapy and Cognitive Behavioral Therapy    Substance Abuse was not addressed during this session. If the client is diagnosed with a co-occurring substance use disorder, please indicate any changes in the frequency or amount of use: N/A. Stage of change for addressing substance use diagnoses: No substance use/Not applicable    ASSESSMENT:  Carol Haynes presents with a Euthymic/ normal mood, her affect is Normal range and intensity, which is congruent, with her mood and the content of the session. The client has made progress on their goals. Carol Haynes presents with a minimal risk of suicide, minimal risk of self-harm, and minimal risk of harm to others.    For any risk assessment that surpasses a \"low\" rating, a safety plan must be developed.    A safety plan was indicated: no  If yes, describe in detail N/A    PLAN: Between sessions, Carol Haynes will enable use of her schedule created to allow for a balance between self-care and academics.. At the next session, the therapist will use Client-centered Therapy and Cognitive Behavioral Therapy to address anxiety and " depression.    Behavioral Health Treatment Plan and Discharge Planning: Carol H Haynes is aware of and agrees to continue to work on their treatment plan. They have identified and are working toward their discharge goals. yes    Visit start and stop times:    10/23/24  Start Time: 1500  Stop Time: 1550  Total Visit Time: 50 minutes

## 2024-10-23 NOTE — TELEPHONE ENCOUNTER
Pt called and asked if you can please print out her immunizations for her to . Please advise [phone] thank you.

## 2024-10-25 ENCOUNTER — TELEPHONE (OUTPATIENT)
Dept: FAMILY MEDICINE CLINIC | Facility: CLINIC | Age: 19
End: 2024-10-25

## 2024-10-29 ENCOUNTER — IMMUNIZATIONS (OUTPATIENT)
Dept: FAMILY MEDICINE CLINIC | Facility: CLINIC | Age: 19
End: 2024-10-29
Payer: COMMERCIAL

## 2024-10-29 DIAGNOSIS — E66.09 CLASS 1 OBESITY DUE TO EXCESS CALORIES WITH SERIOUS COMORBIDITY AND BODY MASS INDEX (BMI) OF 34.0 TO 34.9 IN ADULT: ICD-10-CM

## 2024-10-29 DIAGNOSIS — E66.811 CLASS 1 OBESITY DUE TO EXCESS CALORIES WITH SERIOUS COMORBIDITY AND BODY MASS INDEX (BMI) OF 34.0 TO 34.9 IN ADULT: ICD-10-CM

## 2024-10-29 DIAGNOSIS — Z23 NEED FOR COVID-19 VACCINE: Primary | ICD-10-CM

## 2024-10-29 DIAGNOSIS — Z23 ENCOUNTER FOR IMMUNIZATION: ICD-10-CM

## 2024-10-29 PROCEDURE — 90480 ADMN SARSCOV2 VAC 1/ONLY CMP: CPT

## 2024-10-29 PROCEDURE — 90656 IIV3 VACC NO PRSV 0.5 ML IM: CPT

## 2024-10-29 PROCEDURE — 90471 IMMUNIZATION ADMIN: CPT

## 2024-10-29 PROCEDURE — 91320 SARSCV2 VAC 30MCG TRS-SUC IM: CPT

## 2024-10-29 RX ORDER — SEMAGLUTIDE 2.4 MG/.75ML
2.4 INJECTION, SOLUTION SUBCUTANEOUS WEEKLY
Qty: 9 ML | Refills: 0 | Status: SHIPPED | OUTPATIENT
Start: 2024-10-29 | End: 2025-01-27

## 2024-10-29 RX ORDER — SEMAGLUTIDE 2.4 MG/.75ML
INJECTION, SOLUTION SUBCUTANEOUS
Qty: 3 ML | Refills: 0 | Status: CANCELLED | OUTPATIENT
Start: 2024-10-29

## 2024-10-30 ENCOUNTER — SOCIAL WORK (OUTPATIENT)
Dept: BEHAVIORAL/MENTAL HEALTH CLINIC | Facility: CLINIC | Age: 19
End: 2024-10-30
Payer: COMMERCIAL

## 2024-10-30 DIAGNOSIS — F41.9 ANXIETY: ICD-10-CM

## 2024-10-30 DIAGNOSIS — F32.A DEPRESSION, UNSPECIFIED DEPRESSION TYPE: Primary | ICD-10-CM

## 2024-10-30 PROCEDURE — 90837 PSYTX W PT 60 MINUTES: CPT

## 2024-10-31 NOTE — PSYCH
"Behavioral Health Psychotherapy Progress Note    Psychotherapy Provided: Individual Psychotherapy     1. Depression, unspecified depression type        2. Anxiety            Goals addressed in session: Goal 2     DATA: Carol joined session in person. She took the time to discuss confidence and trust with clinician in session. She discussed with clinician difficulty with trusting herself and feeling as if she \" made up my trauma and did everything for attention\". She reflected on how she has struggled with depression in the past and had to cope and survive on her own own with no support from her family. Clinician encouraged processing of past traumas and validated her strength and efforts to manage her mental health. Carol stated she is \" learning to form a new relationship with self\" that promotes confidence, self trust, and self-assurance. During this session, this clinician used the following therapeutic modalities: Client-centered Therapy and Supportive Psychotherapy    Substance Abuse was not addressed during this session. If the client is diagnosed with a co-occurring substance use disorder, please indicate any changes in the frequency or amount of use: N/A. Stage of change for addressing substance use diagnoses: No substance use/Not applicable    ASSESSMENT:  Carol Haynes presents with a Euthymic/ normal mood, her affect is Normal range and intensity, which is congruent, with her mood and the content of the session. The client has made progress on their goals. Carol Haynes presents with a minimal risk of suicide, minimal risk of self-harm, and minimal risk of harm to others.    For any risk assessment that surpasses a \"low\" rating, a safety plan must be developed.    A safety plan was indicated: no  If yes, describe in detail N/A    PLAN: Between sessions, Carol Haynes will continue to work on increasing her confidence levels by minimizing negative self talk. At the next session, the therapist will use " From: Deng Hector Jr.  To: Kasi Novoa  Sent: 10/20/2020 9:50 AM CDT  Subject: Medication Question    Héctor Hector  1940 here. Need Losartan 25 mg Rx refill  soon down to 7 pills remaining.  Please call Rx refill authorization to [facility]  phone number [phone]. If use Fax that # is same except last 4 is [phone]  Doc Bright asked me to send him my Blood Pressure results and I will   copy/paste recent data here:  today is Oct 20 2020. The below is fairly recent data for Héctor Hector  [birth year]       In response to Dr Novoa request re Losartan resumption effects on Blood Pressure        results since 2020. Please also note on 2 recent Doc visits here (Gout, antihistamine)  the nurse got my blood pressure at approx 137/70.       date  start time AM  still time, minutes BP  pulse squiggles?  Donned Cuff prior still time for below     no  -Jul 6:42 7 144/86 51 no   6:51 7 138/78 51 no  -Jul 6:39 10 148/81 51 no  -Jul 6:50 10 138/76 51 no  15-Jul 6:50 10 129/76 52 no  -Jul 7:10 10 141/87 52 no   7:21 10 128/78 52 no  -Jul 6:58 10 125/71 55 no   7:31 10 143/85 56 no   7:43 10 142/74 53 no   7:15 10 127/83 52 no   7:04 10 147/84 55 no   7:15 10 139/78 56 no  8-Aug 11:38 10 138/73 59 no  10-Aug 5:17 PM 12 132/69 61 no  lapse       -Aug 7:54 AM 11 158/78 55 no  -Aug 8:06 AM 11 150/88 54 no  -Aug 7:45 PM 11 116/64 60 no  16-Oct 5:00 PM 0 176/89 63 no  16-Oct 5:09 PM ? 172/104 64 no  coffee prior AM checks below       19-Oct 10:28 AM 20 152/82 64 no  -Oct 10:59 AM 10 143/78 59 no  -Oct 5:19 PM 13 158/93 61 no  19-Oct 5:33 PM 29(fell asleep!) 137/80 58 no   Cognitive Behavioral Therapy and Supportive Psychotherapy to address self esteem and mood.    Behavioral Health Treatment Plan and Discharge Planning: Craol WING Haynes is aware of and agrees to continue to work on their treatment plan. They have identified and are working toward their discharge goals. yes    Visit start and stop times:    10/30/24  Start Time: 1500  Stop Time: 1553  Total Visit Time: 53 minutes

## 2024-11-20 ENCOUNTER — SOCIAL WORK (OUTPATIENT)
Dept: BEHAVIORAL/MENTAL HEALTH CLINIC | Facility: CLINIC | Age: 19
End: 2024-11-20
Payer: COMMERCIAL

## 2024-11-20 DIAGNOSIS — F32.A DEPRESSION, UNSPECIFIED DEPRESSION TYPE: Primary | ICD-10-CM

## 2024-11-20 DIAGNOSIS — F41.9 ANXIETY: ICD-10-CM

## 2024-11-20 PROCEDURE — 90837 PSYTX W PT 60 MINUTES: CPT

## 2024-11-20 NOTE — PSYCH
"Behavioral Health Psychotherapy Progress Note    Psychotherapy Provided: Individual Psychotherapy     1. Depression, unspecified depression type        2. Anxiety            Goals addressed in session: Goal 1 and Goal 2     DATA: Carol joined session in person. She reports she is doing well and has been allowing herself to feel her emotions. She reports difficulty with a certain class in college that at times leaves her feeling like \" one bad thing happens and suddenly I'm the worst person in the world\". Carol reported she has been attempting to acknowledge her negative emotions instead of masking it and letting it build. During this session, this clinician used the following therapeutic modalities: Client-centered Therapy and Supportive Psychotherapy    Substance Abuse was not addressed during this session. If the client is diagnosed with a co-occurring substance use disorder, please indicate any changes in the frequency or amount of use: n/a. Stage of change for addressing substance use diagnoses: No substance use/Not applicable    ASSESSMENT:  Carol Haynes presents with a Euthymic/ normal mood, her affect is Normal range and intensity, which is congruent, with her mood and the content of the session. The client has made progress on their goals.Carol is making strong efforts to improve her mental health and make active changes to her life. Carol Haynes presents with a minimal risk of suicide, minimal risk of self-harm, and minimal risk of harm to others.    For any risk assessment that surpasses a \"low\" rating, a safety plan must be developed.    A safety plan was indicated: no  If yes, describe in detail n/a    PLAN: Between sessions, Carol Haynes will continue progressing with her treatment plan goals. At the next session, the therapist will use Client-centered Therapy, Cognitive Behavioral Therapy, and Supportive Psychotherapy to address anxiety and depression.    Behavioral Health Treatment Plan and " Discharge Planning: Carol H Haynes is aware of and agrees to continue to work on their treatment plan. They have identified and are working toward their discharge goals. yes    Visit start and stop times:    11/20/24  Start Time: 1500  Stop Time: 1556  Total Visit Time: 56 minutes

## 2024-12-23 DIAGNOSIS — G47.09 OTHER INSOMNIA: ICD-10-CM

## 2024-12-25 DIAGNOSIS — F32.A DEPRESSION, UNSPECIFIED DEPRESSION TYPE: ICD-10-CM

## 2024-12-25 RX ORDER — ESCITALOPRAM OXALATE 20 MG/1
20 TABLET ORAL DAILY
Qty: 90 TABLET | Refills: 1 | Status: SHIPPED | OUTPATIENT
Start: 2024-12-25

## 2024-12-26 RX ORDER — HYDROXYZINE PAMOATE 25 MG/1
25 CAPSULE ORAL
Qty: 90 CAPSULE | Refills: 1 | Status: SHIPPED | OUTPATIENT
Start: 2024-12-26

## 2025-01-05 DIAGNOSIS — E03.9 ACQUIRED HYPOTHYROIDISM: ICD-10-CM

## 2025-01-06 ENCOUNTER — SOCIAL WORK (OUTPATIENT)
Dept: BEHAVIORAL/MENTAL HEALTH CLINIC | Facility: CLINIC | Age: 20
End: 2025-01-06
Payer: COMMERCIAL

## 2025-01-06 DIAGNOSIS — F32.A DEPRESSION, UNSPECIFIED DEPRESSION TYPE: Primary | ICD-10-CM

## 2025-01-06 DIAGNOSIS — F41.9 ANXIETY: ICD-10-CM

## 2025-01-06 PROCEDURE — 90837 PSYTX W PT 60 MINUTES: CPT

## 2025-01-06 RX ORDER — LEVOTHYROXINE SODIUM 137 UG/1
137 TABLET ORAL DAILY
Qty: 90 TABLET | Refills: 1 | Status: SHIPPED | OUTPATIENT
Start: 2025-01-06

## 2025-01-06 NOTE — PSYCH
"Behavioral Health Psychotherapy Progress Note    Psychotherapy Provided: Individual Psychotherapy     1. Depression, unspecified depression type        2. Anxiety            Goals addressed in session: Goal 1 and Goal 2     DATA: Carol joined session in person providing an update on her life since last session. She reported passing her semester, ending a friendship, and dealing with family conflict. She reflected on her friendship ending and noted she was proud of herself because she is \" slowly trying to defend and stand up for self\". She also noted advocating for herself in regards to her family members due to constant feelings of judgement from their end. Clinician allowed for expression and praised progress with self-advocacy. During this session, this clinician used the following therapeutic modalities: Client-centered Therapy and Cognitive Behavioral Therapy    Substance Abuse was not addressed during this session. If the client is diagnosed with a co-occurring substance use disorder, please indicate any changes in the frequency or amount of use: n/a. Stage of change for addressing substance use diagnoses: No substance use/Not applicable    ASSESSMENT:  Carol Haynes presents with a Euthymic/ normal mood her affect is Normal range and intensity, which is congruent, with her mood and the content of the session. The client has made progress on their goals.Carol notes difficulty with staying consistent with her medication management due to new work schedule  Carol Haynes presents with a minimal risk of suicide, minimal risk of self-harm, and minimal risk of harm to others.    For any risk assessment that surpasses a \"low\" rating, a safety plan must be developed.    A safety plan was indicated: no  If yes, describe in detail n/a    PLAN: Between sessions, Carol Haynes will continue working on her treatment plan goals. At the next session, the therapist will use Client-centered Therapy and Cognitive Behavioral " Therapy to address anxiety and depression. Update of treatment plan to occur in coming weeks, Carol was made aware.    Behavioral Health Treatment Plan and Discharge Planning: Carol Haynes is aware of and agrees to continue to work on their treatment plan. They have identified and are working toward their discharge goals. yes    Depression Follow-up Plan Completed: Yes    Visit start and stop times:    01/06/25  Start Time: 1400  Stop Time: 1453  Total Visit Time: 53 minutes

## 2025-01-10 ENCOUNTER — TELEPHONE (OUTPATIENT)
Dept: FAMILY MEDICINE CLINIC | Facility: CLINIC | Age: 20
End: 2025-01-10

## 2025-01-14 NOTE — TELEPHONE ENCOUNTER
PA for Wegovy 2.4MG/0.75ML SUBMITTED to [employer]    via    []CMM-KEY:   [x]Surescripts  []Availity-Auth ID # NDC #   []Faxed to plan   []Other website   []Phone call Case ID #     [x]PA sent as URGENT    All office notes, labs and other pertaining documents and studies sent. Clinical questions answered. Awaiting determination from insurance company.     Turnaround time for your insurance to make a decision on your Prior Authorization can take 7-21 business days.

## 2025-01-14 NOTE — TELEPHONE ENCOUNTER
PA for Wegovy 2.4MG/0.75ML SUBMITTED to [employer] (secondary)    via    []CMM-KEY:   [x]Surescripts-Case ID # [id number]   []Availity-Auth ID # NDC #   []Faxed to plan   []Other website   []Phone call Case ID #     [x]PA sent as URGENT    All office notes, labs and other pertaining documents and studies sent. Clinical questions answered. Awaiting determination from insurance company.     Turnaround time for your insurance to make a decision on your Prior Authorization can take 7-21 business days.

## 2025-01-14 NOTE — TELEPHONE ENCOUNTER
PA for Wegovy 2.4MG/0.75ML DENIED    Reason:    Message sent to office clinical pool No    Denial letter scanned into Media Yes    Appeal started No (Provider will need to decide if appeal is warranted and send clinical documentation to Prior Authorization Team for initiation.)    **Please follow up with your patient regarding denial and next steps**

## 2025-01-15 ENCOUNTER — ANNUAL EXAM (OUTPATIENT)
Dept: OBGYN CLINIC | Facility: CLINIC | Age: 20
End: 2025-01-15
Payer: COMMERCIAL

## 2025-01-15 VITALS
HEIGHT: 62 IN | SYSTOLIC BLOOD PRESSURE: 112 MMHG | DIASTOLIC BLOOD PRESSURE: 68 MMHG | BODY MASS INDEX: 31.76 KG/M2 | WEIGHT: 172.6 LBS

## 2025-01-15 DIAGNOSIS — N92.1 MENORRHAGIA WITH IRREGULAR CYCLE: ICD-10-CM

## 2025-01-15 DIAGNOSIS — Z01.411 ENCOUNTER FOR GYNECOLOGICAL EXAMINATION WITH ABNORMAL FINDING: Primary | ICD-10-CM

## 2025-01-15 DIAGNOSIS — L83 ACANTHOSIS: ICD-10-CM

## 2025-01-15 DIAGNOSIS — R23.4 BREAST SKIN CHANGES: ICD-10-CM

## 2025-01-15 DIAGNOSIS — E28.2 PCOS (POLYCYSTIC OVARIAN SYNDROME): ICD-10-CM

## 2025-01-15 PROCEDURE — 99212 OFFICE O/P EST SF 10 MIN: CPT | Performed by: NURSE PRACTITIONER

## 2025-01-15 PROCEDURE — 99395 PREV VISIT EST AGE 18-39: CPT | Performed by: NURSE PRACTITIONER

## 2025-01-15 RX ORDER — NORGESTIMATE AND ETHINYL ESTRADIOL 7DAYSX3 LO
1 KIT ORAL DAILY
Qty: 84 TABLET | Refills: 4 | Status: SHIPPED | OUTPATIENT
Start: 2025-01-15

## 2025-01-15 NOTE — ASSESSMENT & PLAN NOTE
Refills sent for OCP.  She is being prescribed metformin for insulin resistance by endocrinology.

## 2025-01-15 NOTE — PROGRESS NOTES
Assessment / Plan    Assessment & Plan  Encounter for gynecological examination with abnormal finding  Well woman exam.  CBE only, patient declined pelvic exam / pap since she has never been sexually active.       PCOS (polycystic ovarian syndrome)  Refills sent for OCP.  She is being prescribed metformin for insulin resistance by endocrinology.       Menorrhagia with irregular cycle  As above    Orders:    norgestimate-ethinyl estradiol (ORTHO TRI-CYCLEN LO) 0.18/0.215/0.25 MG-25 MCG per tablet; Take 1 tablet by mouth daily    Acanthosis  Unsure if breast skin changes are related to insulin resistance or of etiology of rash/scarring.  Referral placed to dermatology.  Also recommended showing the skin changes to her endocrinologist.    Orders:    Ambulatory Referral to Dermatology; Future    Breast skin changes    Orders:    Ambulatory Referral to Dermatology; Future        Subjective      Marquita Haynes is a 19 y.o. female who presents for her annual gynecologic exam.    20 yo G0  Presents for first pap.    Seen last 4/2024 for follow up of PCOS and menorrhagia with irregular cycle.  Prescribed OTC lo for menstrual management.   She has also been seeing endocrinology for PCOS related insulin resistance and management of hypothyroidism.  She has lost 24 lbs!    Reports a rash/ itching of her breasts, bilaterally.    Last pap: n/a  Pap hx: NL  STD screening: n/a  STD hx: none  Sexually active: never  Condom use: n/a  Gardasil vaccine: completed  Nutrition: Body mass index is 31.57 kg/m².; given guidelines  Calcium intake: given guidelines  Exercise: 3-4 x per week; given guidelines  Safety: feels safe    Periods are regular, on OCP  Current contraception: abstinence  History of abnormal Pap smear: n/a  Family history of breast,uterine, ovarian or colon cancer: no    The following portions of the patient's history were reviewed and updated as appropriate: allergies, current medications, past family history, past  "medical history, past social history, past surgical history, and problem list.    Review of Systems      Review of Systems   Constitutional:  Negative for chills and fever.   Respiratory:  Negative for cough and shortness of breath.    Gastrointestinal:  Negative for abdominal distention, abdominal pain, blood in stool, constipation, diarrhea, nausea and vomiting.   Genitourinary:  Negative for difficulty urinating, dysuria, frequency, genital sores, hematuria, menstrual problem, pelvic pain, urgency, vaginal bleeding and vaginal discharge.   Musculoskeletal:  Negative for arthralgias and myalgias.     Breasts:  Negative for dimpling, asymmetry, nipple discharge, redness, tenderness or palpable masses.  Reports skin changes bilaterally, dark pigmentation and rash that has left scarring.    Objective     Chaperone: n/a     /68 (BP Location: Right arm, Patient Position: Sitting, Cuff Size: Standard)   Ht 5' 2\" (1.575 m)   Wt 78.3 kg (172 lb 9.6 oz)   LMP 01/10/2025 (Exact Date)   BMI 31.57 kg/m²   Physical Exam  Constitutional:       General: She is not in acute distress.     Appearance: Normal appearance. She is well-developed. She is not ill-appearing or diaphoretic.      Comments: Body mass index is 31.57 kg/m².     HENT:      Head: Normocephalic and atraumatic.   Eyes:      Pupils: Pupils are equal, round, and reactive to light.   Pulmonary:      Effort: Pulmonary effort is normal.   Chest:   Breasts:     Breasts are symmetrical.      Right: Skin change present. No inverted nipple, mass, nipple discharge or tenderness.      Left: Skin change present. No inverted nipple, mass, nipple discharge or tenderness.          Comments: Darker brown pigmentation with scattered small white papules from past rash-- ? scarring  Genitourinary:     Exam position: Lithotomy position.          Comments: Ligher brown pigmentation  Lymphadenopathy:      Cervical:      Right cervical: No superficial cervical adenopathy.     " Left cervical: No superficial cervical adenopathy.      Upper Body:      Right upper body: No supraclavicular adenopathy.      Left upper body: No supraclavicular adenopathy.   Skin:     General: Skin is warm and dry.   Neurological:      General: No focal deficit present.      Mental Status: She is alert and oriented to person, place, and time.   Psychiatric:         Mood and Affect: Mood normal.         Behavior: Behavior normal.         Thought Content: Thought content normal.         Judgment: Judgment normal.

## 2025-01-15 NOTE — PATIENT INSTRUCTIONS
"Patient Education     Diet and health   The Basics   Written by the doctors and editors at [facility]   Why is it important to eat a healthy diet? -- It's important to eat a healthy diet because eating the right foods can keep you healthy now and later in life. It can lower the risk of problems like heart disease, diabetes, high blood pressure, and some types of cancer. It can also help you live longer and improve your quality of life.  What kind of diet is best? -- There is no 1 specific diet that experts recommend for everyone. People choose what foods to eat for many different reasons. These include personal preference, culture, Muslim, allergies or intolerances, and nutritional goals. People also need to consider the cost and availability of different foods.  In general, experts recommend a diet that:   Includes lots of vegetables, fruits, beans, nuts, and whole grains   Limits red and processed meats, unhealthy fats, sugar, salt, and alcohol  What are dietary patterns? -- A dietary \"pattern\" means generally eating certain types of foods while limiting others. Some people need to follow a specific dietary pattern because of their health needs. For example, if you have high blood pressure, your doctor might recommend a diet low in salt.  If you are trying to improve your health in general, choosing a healthy dietary pattern can help. This does not have to mean being very strict about what you eat or avoid. The goal is to think about getting plenty of healthy foods while limiting less healthy foods.  Examples of dietary patterns include:   Mediterranean diet - This involves eating a lot of fruits, vegetables, nuts, and whole grains, and uses olive oil instead of other fats. It also includes some fish, poultry, and dairy products, but not a lot of red meat. Following this diet can help your overall health, and might even lower your risk of having a stroke.   Plant-based diets - These patterns focus on vegetables, " "fruits, grains, beans, and nuts. They limit or avoid food that comes from animals, such as meat and dairy. There are different types of plant-based diets, including vegetarian and vegan.   Low-fat diet - A low-fat diet involves limiting calories from fat. This might help some people keep weight off if that is their goal, but it does not have many other health benefits. If you choose to follow a low-fat diet, it is also important to focus on getting lots of whole grains, legumes, fruits, and vegetables. Limit refined grains and sugar.   Low-cholesterol diet - Cholesterol is found in foods with a lot of saturated fat, like red meat, butter, and cheese. A low-cholesterol diet focuses on limiting the amount of cholesterol that you eat. Limiting the cholesterol in your diet can also help lower the amount of unhealthy fats that you eat.  Which foods are especially healthy? -- Foods that are especially healthy include:   Fruits and vegetables - Eating a diet with lots of fruits and vegetables can help prevent heart disease and stroke. It might also help prevent certain types of cancer. Try to eat fruits and vegetables at each meal and also for snacks. If you don't have fresh fruits and vegetables available, you can eat frozen or canned ones instead. Doctors recommend eating at least 5 servings of fruits or vegetables each day.   Whole grains - Whole-grain foods include 100 percent whole-wheat bread, steel cut oats, and whole-grain pasta. These are healthier than foods made with \"refined\" grains, like white bread and white rice. Eating lots of whole grains instead of refined grains has been shown to help with weight control. It can also lower the risk of several health problems, including colon cancer, heart disease, and diabetes. Doctors recommend that most people try to eat 5 to 8 servings of whole-grain, high-fiber foods each day.   Foods with fiber - Eating foods with a lot of fiber can help prevent heart disease and " "stroke. If you have type 2 diabetes, it can also help control your blood sugar. Foods that have a lot of fiber include vegetables, fruits, beans, nuts, oatmeal, and whole-grain breads and cereals. You can tell how much fiber is in a food by reading the nutrition label (figure 1). Doctors recommend that most people eat about 25 to 34 grams of fiber each day.   Foods with calcium and vitamin D - Babies, children, and adults need calcium and vitamin D to help keep their bones strong. Adults also need calcium and vitamin D to help prevent osteoporosis. Osteoporosis is a condition that causes bones to get \"thin\" and break more easily than usual. Different foods and drinks have calcium and vitamin D in them (figure 2). People who don't get enough calcium and vitamin D in their diet might need to take a supplement. Doctors recommend that most people have 2 to 3 servings of foods with calcium and vitamin D each day.   Foods with protein - Protein helps your muscles and bones stay strong. Healthy foods with a lot of protein include chicken, fish, eggs, beans, nuts, and soy products. Red meat also has a lot of protein, but it also contains fats, which can be unhealthy. Doctors recommend that most people try to eat about 5 servings of protein each day.   Healthy fats - There are different types of fats. Some types of fats are better for your body than others. Healthy fats are \"monounsaturated\" or \"polyunsaturated\" fats. These are found in fatty fish, nuts and nut butters, and avocados. Use plant-based oils when cooking. Examples of these oils include olive, canola, safflower, sunflower, and corn oil. Eating foods with healthy fats, while avoiding or limiting foods with unhealthy fats, might lower the risk of heart disease.   Foods with folate - Folate is a vitamin that is important for pregnant people, since it helps prevent certain birth defects. It is also called \"folic acid.\" Anyone who could get pregnant should get at " "least 400 micrograms of folic acid daily, whether or not they are actively trying to get pregnant. Folate is found in many breakfast cereals, oranges, orange juice, and green leafy vegetables.  What foods should I avoid or limit? -- To eat a healthy diet, there are some things that you should avoid or limit. They include:   Unhealthy fats - \"Trans\" fats are especially unhealthy. They are found in margarines, many fast foods, and some store-bought baked goods. \"Saturated\" fats are found in animal products like meats, egg yolks, butter, cheese, and full-fat milk products. Unhealthy fats can raise your cholesterol level and increase your chance of getting heart disease.   Sugar - To have a healthy diet, it's important to limit or avoid added sugar, sweets, and refined grains. Refined grains are found in white bread, white rice, most pastas, and most packaged \"snack\" foods.  Avoiding sugar-sweetened beverages, like soda and sports drinks, can also help improve your health.  Avoid canned fruits in \"heavy\" syrup.   Red and processed meats - Studies have shown that eating a lot of red meat can increase your risk of certain health problems, including heart disease and cancer. You should limit the amount of red meat that you eat. This is also true for processed meats like sausage, hot dogs, and mccray.  Can I drink alcohol as part of a healthy diet? -- Not drinking alcohol at all is the healthiest choice. Regular drinking can raise a person's chances of getting liver disease and certain types of cancers. In females, even 1 drink a day can increase the risk of getting breast cancer.  If you do choose to drink, most doctors recommend limiting alcohol to no more than:   1 drink a day for females   2 drinks a day for males  The limits are different because, generally, the female body takes longer to break down alcohol.  How many calories do I need each day? -- Calories give your body energy. The number of calories that you need " "each day depends on your weight, height, age, sex, and how active you are.  Your doctor or nurse can tell you about how many calories you should eat each day. You can also work with a dietitian (nutrition expert) to learn more about your dietary needs and options.  What if I am having trouble improving my diet? -- It can be hard to change the way that you eat. Remember that even small changes can improve your health.  Here are some tips that might help:   Try to make fruits and vegetables part of every meal. If you don't have fresh fruits and vegetables, frozen or canned are good options. Look for products without added salt or sugar.   Keep a bowl of fruit out for snacking.   When you can, choose whole grains instead of refined grains. Choose chicken, fish, and beans instead of red meat and cheese.   Try to eat prepared and processed foods less often.   Try flavored seltzer or water instead of soda or juice.   When eating at fast food restaurants, look for healthier items, like broiled chicken or salad.  If you have questions about which foods you should or should not eat, ask your doctor, nurse, or dietitian. The right diet for you will depend, in part, on your health and any medical conditions you have.  All topics are updated as new evidence becomes available and our peer review process is complete.  This topic retrieved from [email or web address] on: Feb 28, 2024.  Topic 99582 Version 28.0  Release: 32.2.4 - C32.58  © 2024 UpToDate, Inc. and/or its affiliates. All rights reserved.  figure 1: Nutrition label - Fiber     This is an example of a nutrition label. To figure out how much fiber is in a food, look for the line that says \"Dietary Fiber.\" It's also important to look at the serving size. This food has 7 grams of fiber in each serving, and each serving is 1 cup.  Graphic 64042 Version 8.0  figure 2: Foods and drinks with calcium and vitamin D     Foods rich in calcium include ice cream, soy milk, breads, kale, " "broccoli, milk, cheese, cottage cheese, almonds, yogurt, ready-to-eat cereals, beans, and tofu. Foods rich in vitamin D include milk, fortified plant-based \"milks\" (soy, almond), canned tuna fish, cod liver oil, yogurt, ready-to-eat-cereals, cooked salmon, canned sardines, mackerel, and eggs. Some of these foods are rich in both.  Graphic 00011 Version 4.0  Consumer Information Use and Disclaimer   Disclaimer: This generalized information is a limited summary of diagnosis, treatment, and/or medication information. It is not meant to be comprehensive and should be used as a tool to help the user understand and/or assess potential diagnostic and treatment options. It does NOT include all information about conditions, treatments, medications, side effects, or risks that may apply to a specific patient. It is not intended to be medical advice or a substitute for the medical advice, diagnosis, or treatment of a health care provider based on the health care provider's examination and assessment of a patient's specific and unique circumstances. Patients must speak with a health care provider for complete information about their health, medical questions, and treatment options, including any risks or benefits regarding use of medications. This information does not endorse any treatments or medications as safe, effective, or approved for treating a specific patient. UpToDate, Inc. and its affiliates disclaim any warranty or liability relating to this information or the use thereof.The use of this information is governed by the Terms of Use, available at https://www.wolterskluwer.com/en/know/clinical-effectiveness-terms. 2024© UpToDate, Inc. and its affiliates and/or licensors. All rights reserved.  Copyright   © 2024 UpToDate, Inc. and/or its affiliates. All rights reserved.  Patient Education     Calcium Rich Diet   About this topic   Calcium is one of the most important minerals and is found in almost all parts of your " body. It makes your teeth and bones strong and healthy. Your body does not make calcium. It is important for you to eat calcium rich foods to get enough calcium. It also helps your body:  Make blood clots  Keep your heartbeat normal  Helps blood move throughout the body  Release hormones  Release enzymes  Send and get signals between your nerves and brain  Make muscles work well         What will the results be?   It is important to have a good amount of calcium in your food. Calcium helps your body work well. It is very important during every life stage. Calcium may also keep you from losing bone mass. This is osteopenia. It may help keep you from having weak bones. This is osteoporosis.  What drugs may be needed?   The doctor may order calcium and vitamin D supplements. You need vitamin D to help your body take in the calcium. Your calcium supplement may have vitamin D in it.  Talk to your doctor about:  If it is OK to take your calcium with food.  How often to take your calcium supplement throughout the day.  All the drugs you are taking. Be sure to include all prescription and over-the-counter (OTC) drugs, and herbal supplements. Tell the doctor about any drug allergy. Bring a list of drugs you take with you. Some drugs may cause problems with how your body takes in calcium.  Will physical activity be limited?   No, physical activities will not be limited. It is good for you to do light exercises and to stay active.  What changes to diet are needed?   You will need to watch how much calcium is in the foods you eat. Your doctor will talk to you about the right amount of calcium for you. How much calcium you need is based on your age and life stage.  When is this diet used?   This diet is used when your normal diet is low in calcium. It is needed to raise the amount of calcium in your diet. Our bodies do not take in calcium well as we get older.  Who should not use this diet?   People with too much calcium in  their blood should not use this diet. This is called hypercalcemia.  What foods are good to eat?   Milk, yogurt, and cheese products are naturally high in calcium.  These foods have smaller amounts of calcium. If they are eaten often and in large amounts they can be good sources of calcium:  Oysters; dried fruit like figs and raisins; green leafy vegetables like broccoli, collards, kale, mustard greens, bok mil; soy beans; oranges  [place] and sardines. Be sure to eat the soft bones.  Nuts like almonds and [place] nuts, sunflower seeds, tahini, dried beans  Food products with calcium added to them like orange juice, tofu, cereal, bread; almond milk, rice milk, soy milk  What foods should be limited or avoided?   People who eat many kinds of foods do not have to be worried about limiting or avoiding certain foods.   What problems could happen?   Some people may get kidney stones. This may happen after taking high amounts of calcium over a long time. Calcium from food does not seem to cause kidney stones.  Hard stools.  Too much calcium may interfere with your body’s ability to absorb iron and zinc.  When do I need to call the doctor?   Call your doctor if you have concerns about your diet. Tell your doctor if you are allergic to any of the foods in your diet.  Helpful tips   If you have problems digesting milk, try lactose-free milk. You may also use a product to help you take in lactose.  Talk with your doctor if you are a vegetarian and do not eat dairy products. Your doctor can help you make sure you get the amount of calcium your body needs.  Last Reviewed Date   2021-03-12  Consumer Information Use and Disclaimer   This generalized information is a limited summary of diagnosis, treatment, and/or medication information. It is not meant to be comprehensive and should be used as a tool to help the user understand and/or assess potential diagnostic and treatment options. It does NOT include all information about  conditions, treatments, medications, side effects, or risks that may apply to a specific patient. It is not intended to be medical advice or a substitute for the medical advice, diagnosis, or treatment of a health care provider based on the health care provider's examination and assessment of a patient’s specific and unique circumstances. Patients must speak with a health care provider for complete information about their health, medical questions, and treatment options, including any risks or benefits regarding use of medications. This information does not endorse any treatments or medications as safe, effective, or approved for treating a specific patient. UpToDate, Inc. and its affiliates disclaim any warranty or liability relating to this information or the use thereof. The use of this information is governed by the Terms of Use, available at https://www.Be-Bound.com/en/know/clinical-effectiveness-terms   Copyright   Copyright © 2024 UpToDate, Inc. and its affiliates and/or licensors. All rights reserved.  Patient Education     Exercise and movement   The Basics   Written by the doctors and editors at [employer]   What are the benefits of movement? -- Moving your body has many benefits. It can:   Burn calories, which helps people manage their weight   Help control blood sugar levels in people with diabetes   Lower blood pressure, especially in people with high blood pressure   Lower stress, and help with depression and anxiety   Keep bones strong, so they don't get thin and break easily   Lower the chance of dying from heart disease  Adding even small amounts of physical activity to your daily routine can improve your health.  What are the main types of exercise? -- There are 3 main types of exercise:   Aerobic exercise - This raises your heart rate. Examples include walking, running, dancing, riding a bike, and swimming.   Muscle strengthening - This helps make your muscles stronger. You can do it using  weights, exercise bands, or weight machines. You can also use your own body weight, as with push-ups, or by lifting items in your home, like jugs of water.   Stretching - These help your muscles and joints move more easily.  It's important to have all 3 types in your exercise program. That way, your body, muscles, and joints can be as healthy as possible.  Should I talk to my doctor or nurse before I start exercising? -- If you have not exercised before or have not exercised in a long time, talk with your doctor or nurse before you start a very active exercise program.  If you have heart disease or risk factors for heart disease (like high blood pressure or diabetes), your doctor or nurse might recommend that you have an exercise test before starting an exercise program.  When you begin an exercise program, start slowly. For example, do the exercise at a slow pace or for a few minutes only. Over time, you can exercise faster and for longer periods of time.  What should I do when I exercise? -- Each time you exercise, you should:   Warm up - This can help keep you from hurting your muscles when you exercise. To warm up, do a light aerobic exercise (such as walking slowly) or stretch for 5 to 10 minutes.   Work out - Try to get a mix of aerobic exercise, muscle strengthening, and stretching. During an aerobic workout, you can walk fast, swim, run, or use an exercise machine, for example. Other activities, like dancing or playing tennis, are also forms of aerobic exercise. You should also take time to stretch all of your joints, including your neck, shoulders, back, hips, and knees. At least 2 times a week, you can do muscle strengthening exercises as part of your workout.   Cool down - This helps keep you from feeling dizzy after you exercise and helps prevent muscle cramps. To cool down, you can stretch or do a light aerobic exercise for 5 minutes.  Some people go to a gym or do group exercise classes. But you can  exercise even without these things. Some exercises can be done even in a small space. You can also try online videos or smartphone apps to get ideas for different types of exercise.  How often should I exercise? -- Doctors recommend that people exercise at least 30 minutes a day, on 5 or more days of the week.  If you can't exercise for 30 minutes straight, try to exercise for 10 minutes at a time, 3 or 4 times a day. Even exercising for shorter amounts of time is good for you, especially if it means spending less time sitting.  When should I call my doctor or nurse? -- If you have any of the following symptoms when you exercise, stop exercising and call your doctor or nurse right away:   Pain or pressure in your chest, arms, throat, jaw, or back   Nausea or vomiting   Feeling like your heart is fluttering or racing very fast   Feeling dizzy or faint  What if I don't have time to exercise? -- Many people have very busy lives and might not think that they have time to exercise. But it's important to try to find time, even if you are tired or work a lot. Exercise can increase your energy level, which can make you feel better and might even help you get more work done.  Even if it's hard to set aside a lot of time to exercise, you can still improve your health by moving your body more. There are many ways that you can be more active. For example, you can:   Take the stairs instead of the elevator.   Park in a parking space that is farther away from the door.   Take a longer route when you walk from one place to another.  Spending a lot of time sitting still (for example, watching TV or working on the computer) is bad for your health. Try to get up and move around whenever you can. Even small amounts of movement, like taking short walks, doing household chores, or gardening, can improve your health. Finding activities that you enjoy, or doing them with other people, can help you add more movement into your daily  life.  What else should I do when I exercise? -- To exercise safely and avoid problems, it's important to:   Drink fluids during and after exercising (but avoid drinks with a lot of caffeine or sugar).   Avoid exercising outside if it is too hot or cold.   Wear layers of clothes, so that you can take them off if you get too hot.   Wear shoes that fit well and support your feet.   Be aware of your surroundings if you exercise outside.  All topics are updated as new evidence becomes available and our peer review process is complete.  This topic retrieved from [employer] on: May 18, 2024.  Topic 42952 Version 31.0  Release: 32.4.3 - C32.137  © 2024 UpToDate, Inc. and/or its affiliates. All rights reserved.  Consumer Information Use and Disclaimer   Disclaimer: This generalized information is a limited summary of diagnosis, treatment, and/or medication information. It is not meant to be comprehensive and should be used as a tool to help the user understand and/or assess potential diagnostic and treatment options. It does NOT include all information about conditions, treatments, medications, side effects, or risks that may apply to a specific patient. It is not intended to be medical advice or a substitute for the medical advice, diagnosis, or treatment of a health care provider based on the health care provider's examination and assessment of a patient's specific and unique circumstances. Patients must speak with a health care provider for complete information about their health, medical questions, and treatment options, including any risks or benefits regarding use of medications. This information does not endorse any treatments or medications as safe, effective, or approved for treating a specific patient. UpToDate, Inc. and its affiliates disclaim any warranty or liability relating to this information or the use thereof.The use of this information is governed by the Terms of Use, available at  https://www.woltersArkimediauwer.com/en/know/clinical-effectiveness-terms. 2024© [employer] and its affiliates and/or licensors. All rights reserved.  Copyright   © 2024 [employer] and/or its affiliates. All rights reserved.

## 2025-01-15 NOTE — TELEPHONE ENCOUNTER
PA for Wegovy 2.4MG/0.75ML APPROVED     Date(s) approved: 01/14/2025-07/14/2025    Case #[id number]     Patient advised by          []MyChart Message  []Phone call   []LMOM  []L/M to call office as no active Communication consent on file  [x]Unable to LM as there was no voicemail        Pharmacy advised by    [x]Fax  []Phone call    Approval letter scanned into Media Yes

## 2025-01-16 ENCOUNTER — SOCIAL WORK (OUTPATIENT)
Dept: BEHAVIORAL/MENTAL HEALTH CLINIC | Facility: CLINIC | Age: 20
End: 2025-01-16
Payer: COMMERCIAL

## 2025-01-16 DIAGNOSIS — F32.A DEPRESSION, UNSPECIFIED DEPRESSION TYPE: Primary | ICD-10-CM

## 2025-01-16 DIAGNOSIS — F41.9 ANXIETY: ICD-10-CM

## 2025-01-16 PROCEDURE — 90834 PSYTX W PT 45 MINUTES: CPT

## 2025-01-16 NOTE — PSYCH
"Behavioral Health Psychotherapy Progress Note    Psychotherapy Provided: Individual Psychotherapy     1. Depression, unspecified depression type        2. Anxiety            Goals addressed in session: Goal 1 and Goal 2     DATA: Carol joined session in person. She utilized time in session to review treatment plan goals with clinician. She noted marked improvement with her goals related to procrastination but noted need for improvement to improve upon time management and maintaining motivation more. She also reports being able to improve confidence levels but does note difficulty with self-praise and rewards. Carol notes she has been striving to accomplish her goals further and continuously improve. During this session, this clinician used the following therapeutic modalities: Engagement Strategies    Substance Abuse was not addressed during this session. If the client is diagnosed with a co-occurring substance use disorder, please indicate any changes in the frequency or amount of use: n/a. Stage of change for addressing substance use diagnoses: No substance use/Not applicable    ASSESSMENT:  Carol Haynes presents with a Euthymic/ normal mood her affect is Normal range and intensity, which is congruent, with her mood and the content of the session. The client has made progress on their goals. Carol Haynes presents with a minimal risk of suicide, minimal risk of self-harm, and minimal risk of harm to others.    For any risk assessment that surpasses a \"low\" rating, a safety plan must be developed.    A safety plan was indicated: no  If yes, describe in detail n/a    PLAN: Between sessions, Carol Haynes will continue working on her treatment plan goals. At the next session, the therapist will use Client-centered Therapy and Cognitive Behavioral Therapy to address mood.    Behavioral Health Treatment Plan and Discharge Planning: Carol Haynes is aware of and agrees to continue to work on their treatment plan. " They have identified and are working toward their discharge goals. yes    Depression Follow-up Plan Completed: Yes    Visit start and stop times:    01/16/25  Start Time: 1616  Stop Time: 1657  Total Visit Time: 41 minutes

## 2025-01-16 NOTE — BH TREATMENT PLAN
"Outpatient Behavioral Health Psychotherapy Treatment Plan     Marquita Haynes  2005      Date of Initial Psychotherapy Assessment: 7/22/2024   Date of Current Treatment Plan: 01/16/2025  Treatment Plan Target Date: 7/15/2025  Treatment Plan Expiration Date: 7/15/2025     Diagnosis:   1. Depression, unspecified depression type          2. Anxiety                Area(s) of Need: Time management, Self-Esteem     Long Term Goal 1 (in the client's own words): \" Fix my procrastination issues\" Update: working to be proactive and productive, managing time well but needs improvement on consistency and maintaining motivation     Stage of Change: Contemplation     Target Date for completion: TBD             Anticipated therapeutic modalities: Cognitive Behavioral Therapy and Client-Centered Therapy             People identified to complete this goal: Marquita and this clinician are responsible for this treatment goal                    Objective 1: (identify the means of measuring success in meeting the objective): Clinician will work with Marquita to improve time management and organizational skills in order to minimize procrastination.                    Objective 2: (identify the means of measuring success in meeting the objective): Marquita will work to expand her intrinsic motivation for tasks in order to improve completion.      Long Term Goal 2 (in the client's own words): \" I would like to work on my self- confidence\"  Update: progressing with confidence levels emotionally and physically improving needs improvements with self-praise to minimize perfectionist tendencies     Stage of Change: Contemplation     Target Date for completion: TBD             Anticipated therapeutic modalities: Cognitive Behavioral Therapy and Client-Centered Therapy             People identified to complete this goal: Marquita and this clinician are responsible for this treatment goal                    Objective 1: (identify the means of measuring " "success in meeting the objective): Marquita will work to minimize negative self-talk and work on challenging and reframing them.                    Objective 2: (identify the means of measuring success in meeting the objective): Marquita  will work to find a healthy balance between task completion and achievements and enhancing her confidence with items completed.      I am currently under the care of a [facility] psychiatric provider: no     My [facility] psychiatric provider is: N/A Prescribed by PCP     I am currently taking psychiatric medications: Yes, as prescribed     I feel that I will be ready for discharge from mental health care when I reach the following (measurable goal/objective): \" Life would be better if I work on my goals throughout session and it wouldn't be as gloomy and if I fall into my old habits I will be able to get myself out of it\".     For children and adults who have a legal guardian:          Has there been any change to custody orders and/or guardianship status? No. If yes, attach updated documentation.     I have created my Crisis Plan and have been offered a copy of this plan     Behavioral Health Treatment Plan [facility]: Diagnosis and Treatment Plan explained to Marquita Haynes acknowledges an understanding of their diagnosis. Marquita Haynes agrees to this treatment plan.     I have been offered a copy of this Treatment Plan. yes           "

## 2025-01-17 ENCOUNTER — OFFICE VISIT (OUTPATIENT)
Dept: FAMILY MEDICINE CLINIC | Facility: CLINIC | Age: 20
End: 2025-01-17
Payer: COMMERCIAL

## 2025-01-17 VITALS
HEART RATE: 84 BPM | DIASTOLIC BLOOD PRESSURE: 78 MMHG | TEMPERATURE: 97.7 F | SYSTOLIC BLOOD PRESSURE: 110 MMHG | OXYGEN SATURATION: 98 % | BODY MASS INDEX: 31.32 KG/M2 | HEIGHT: 62 IN | RESPIRATION RATE: 16 BRPM | WEIGHT: 170.2 LBS

## 2025-01-17 DIAGNOSIS — E66.811 CLASS 1 OBESITY DUE TO EXCESS CALORIES WITH SERIOUS COMORBIDITY AND BODY MASS INDEX (BMI) OF 34.0 TO 34.9 IN ADULT: Primary | ICD-10-CM

## 2025-01-17 DIAGNOSIS — E03.9 ACQUIRED HYPOTHYROIDISM: ICD-10-CM

## 2025-01-17 DIAGNOSIS — F41.9 ANXIETY: ICD-10-CM

## 2025-01-17 DIAGNOSIS — E66.09 CLASS 1 OBESITY DUE TO EXCESS CALORIES WITH SERIOUS COMORBIDITY AND BODY MASS INDEX (BMI) OF 34.0 TO 34.9 IN ADULT: Primary | ICD-10-CM

## 2025-01-17 DIAGNOSIS — Z00.00 HEALTHCARE MAINTENANCE: ICD-10-CM

## 2025-01-17 DIAGNOSIS — F32.A DEPRESSION, UNSPECIFIED DEPRESSION TYPE: ICD-10-CM

## 2025-01-17 DIAGNOSIS — R21 RASH: ICD-10-CM

## 2025-01-17 DIAGNOSIS — E28.2 PCOS (POLYCYSTIC OVARIAN SYNDROME): ICD-10-CM

## 2025-01-17 DIAGNOSIS — R73.9 HYPERGLYCEMIA: ICD-10-CM

## 2025-01-17 DIAGNOSIS — D50.8 OTHER IRON DEFICIENCY ANEMIA: ICD-10-CM

## 2025-01-17 PROCEDURE — 99214 OFFICE O/P EST MOD 30 MIN: CPT | Performed by: NURSE PRACTITIONER

## 2025-01-17 PROCEDURE — 99395 PREV VISIT EST AGE 18-39: CPT | Performed by: NURSE PRACTITIONER

## 2025-01-17 RX ORDER — TRIAMCINOLONE ACETONIDE 5 MG/G
CREAM TOPICAL 2 TIMES DAILY PRN
Qty: 45 G | Refills: 0 | Status: SHIPPED | OUTPATIENT
Start: 2025-01-17

## 2025-01-17 RX ORDER — SEMAGLUTIDE 2.4 MG/.75ML
2.4 INJECTION, SOLUTION SUBCUTANEOUS WEEKLY
Qty: 9 ML | Refills: 1 | Status: SHIPPED | OUTPATIENT
Start: 2025-01-17 | End: 2025-04-17

## 2025-01-17 NOTE — ASSESSMENT & PLAN NOTE
- Hemoglobin A1c elevated at 5.7 in June of 2024. Has likely improved since her weight loss.  - Encourage low carb diet and regular exercise.  - Due for repeat fasting glucose and A1c.   Orders:  •  Hemoglobin A1C; Future

## 2025-01-17 NOTE — ASSESSMENT & PLAN NOTE
- No longer taking iron.   - Due for repeat CBC and iron panel.   Orders:  •  CBC and differential; Future  •  Iron Panel (Includes Ferritin, Iron Sat%, Iron, and TIBC); Future

## 2025-01-17 NOTE — ASSESSMENT & PLAN NOTE
- Down 16 pounds since last visit. Down 25 pounds total since May.   - Continue Wegovy 2.4 mg weekly. Discussed side effects.  - Encourage healthy diet and regular exercise.  - Recommend follow up in 3 months.   Orders:  •  Semaglutide-Weight Management (Wegovy) 2.4 MG/0.75ML; Inject 0.75 mL (2.4 mg total) under the skin once a week Inject 2.4 mg under the skin weekly

## 2025-01-17 NOTE — ASSESSMENT & PLAN NOTE
- Reviewed immunizations and screenings.   - UTD with dental and GYN exams.   - Routine labs ordered.   Orders:  •  CBC and differential; Future  •  Comprehensive metabolic panel; Future  •  Lipid Panel with Direct LDL reflex; Future  •  TSH, 3rd generation with Free T4 reflex; Future

## 2025-01-17 NOTE — PROGRESS NOTES
Name: Marquita Haynes      : 2005      MRN: [id number]  Encounter Provider: ADRIAN Beckford  Encounter Date: 2025   Encounter department: [facility]    Assessment & Plan  Class 1 obesity due to excess calories with serious comorbidity and body mass index (BMI) of 34.0 to 34.9 in adult  - Down 16 pounds since last visit. Down 25 pounds total since May.   - Continue Wegovy 2.4 mg weekly. Discussed side effects.  - Encourage healthy diet and regular exercise.  - Recommend follow up in 3 months.   Orders:  •  Semaglutide-Weight Management (Wegovy) 2.4 MG/0.75ML; Inject 0.75 mL (2.4 mg total) under the skin once a week Inject 2.4 mg under the skin weekly    Rash  - Prescription sent for triamcinolone cream.   - Follow up if no improvement.   Orders:  •  triamcinolone (KENALOG) 0.5 % cream; Apply topically 2 (two) times a day as needed for rash    Other iron deficiency anemia  - No longer taking iron.   - Due for repeat CBC and iron panel.   Orders:  •  CBC and differential; Future  •  Iron Panel (Includes Ferritin, Iron Sat%, Iron, and TIBC); Future    Acquired hypothyroidism  - Continue levothyroxine 137 mcg daily.  - Due for repeat TSH and free T4.   - Continue follow up with Endocrinology.   Orders:  •  TSH, 3rd generation with Free T4 reflex; Future    Hyperglycemia  - Hemoglobin A1c elevated at 5.7 in 2024. Has likely improved since her weight loss.  - Encourage low carb diet and regular exercise.  - Due for repeat fasting glucose and A1c.   Orders:  •  Hemoglobin A1C; Future    PCOS (polycystic ovarian syndrome)  - Continue follow up with GYN and Endocrinology.        Depression, unspecified depression type  - Stable on Lexapro to 20 mg daily. Continue same.  - Will continue to monitor.          Anxiety  - Stable on Lexapro to 20 mg daily. Continue same.  - Will continue to monitor.          Healthcare maintenance  - Reviewed immunizations and screenings.    - UTD with dental and GYN exams.   - Routine labs ordered.   Orders:  •  CBC and differential; Future  •  Comprehensive metabolic panel; Future  •  Lipid Panel with Direct LDL reflex; Future  •  TSH, 3rd generation with Free T4 reflex; Future         History of Present Illness     Patient with PMH of hypothyroidism, PCOS, anxiety, and depression presents to office today for follow up. She is taking her prescribed medications and reports no side effects. She continues on Wegovy 2.4 mg weekly. She is down 16 pounds since her last visit in September. She has complaints of a rash on her chest that has been present for a few months. Rash is usually itchy. She has tried applying moisturizing lotion with no improvement. States she has scaring on her chest from where she was itching. The rash is not too bad today. She denies any other concerns or complaints.        Review of Systems   Constitutional:  Negative for fatigue and fever.   HENT:  Negative for congestion, rhinorrhea and trouble swallowing.    Eyes:  Negative for visual disturbance.   Respiratory:  Negative for cough and shortness of breath.    Cardiovascular:  Negative for chest pain and palpitations.   Gastrointestinal:  Negative for abdominal pain and blood in stool.   Endocrine: Negative for cold intolerance and heat intolerance.   Genitourinary:  Negative for difficulty urinating, dysuria and frequency.   Musculoskeletal:  Negative for gait problem.   Skin:  Positive for rash.   Neurological:  Negative for dizziness, syncope and headaches.   Hematological:  Negative for adenopathy.   Psychiatric/Behavioral:  Negative for behavioral problems.      Past Medical History:   Diagnosis Date   • Anemia    • BMI 35.0-35.9,adult    • Depression    • Hypothyroidism    • Polycystic ovary syndrome      Past Surgical History:   Procedure Laterality Date   • NO PAST SURGERIES       Family History   Problem Relation Age of Onset   • Diabetes Mother    • Diabetes Father     • Hypertension Maternal Grandmother    • Breast cancer Neg Hx    • Colon cancer Neg Hx    • Ovarian cancer Neg Hx    • Uterine cancer Neg Hx      Social History     Tobacco Use   • Smoking status: Never   • Smokeless tobacco: Never   Vaping Use   • Vaping status: Never Used   Substance and Sexual Activity   • Alcohol use: No   • Drug use: Never   • Sexual activity: Never     Current Outpatient Medications on File Prior to Visit   Medication Sig   • escitalopram (LEXAPRO) 20 mg tablet TAKE 1 TABLET BY MOUTH EVERY DAY   • hydrOXYzine HCL (ATARAX) 10 mg tablet Take 1 tablet (10 mg total) by mouth every 6 (six) hours as needed for anxiety   • hydrOXYzine pamoate (VISTARIL) 25 mg capsule TAKE 1 CAPSULE (25 MG TOTAL) BY MOUTH DAILY AT BEDTIME AS NEEDED (SLEEP)   • levothyroxine 137 mcg tablet TAKE 1 TABLET BY MOUTH EVERY DAY   • metFORMIN (GLUCOPHAGE-XR) 500 mg 24 hr tablet TAKE 1 TABLET BY MOUTH EVERY DAY   • norgestimate-ethinyl estradiol (ORTHO TRI-CYCLEN LO) 0.18/0.215/0.25 MG-25 MCG per tablet Take 1 tablet by mouth daily   • ondansetron (ZOFRAN) 4 mg tablet Take 1 tablet (4 mg total) by mouth every 8 (eight) hours as needed for nausea or vomiting   • [DISCONTINUED] Semaglutide-Weight Management (Wegovy) 2.4 MG/0.75ML Inject 0.75 mL (2.4 mg total) under the skin once a week Inject 2.4 mg under the skin weekly   • ascorbic acid (VITAMIN C) 500 MG TBCR Take 1 tablet (500 mg total) by mouth daily (Patient not taking: Reported on 1/15/2025)   • ferrous sulfate 324 (65 Fe) mg Take 1 tablet (324 mg total) by mouth 2 (two) times a day before meals (Patient not taking: Reported on 1/15/2025)     Allergies   Allergen Reactions   • No Active Allergies      Immunization History   Administered Date(s) Administered   • COVID-19 PFIZER VACCINE 0.3 ML IM 08/26/2022   • COVID-19 Pfizer mRNA vacc PF mario-sucrose 12 yr and older (Comirnaty) 10/29/2024   • COVID-19 Pfizer vac (Mario-sucrose, gray cap) 12 yr+ IM 08/26/2022, 03/30/2023  "  • HPV 06/23/2017   • HPV9 03/22/2017, 06/23/2017, 11/01/2017   • Hep A, ped/adol, 2 dose 11/01/2017   • Hep B, Adolescent or Pediatric 02/22/2017, 03/22/2017, 06/23/2017   • Hepatitis A 03/22/2017, 11/01/2017   • INFLUENZA 10/12/2018, 01/20/2020   • IPV 02/22/2017, 03/22/2017, 11/01/2017, 01/21/2020   • Influenza, injectable, quadrivalent, preservative free 0.5 mL 01/20/2020, 10/18/2023   • Influenza, seasonal, injectable, preservative free 10/29/2024   • MMR 02/22/2017, 03/22/2017   • MMRV 03/22/2017   • Meningococcal Conjugate (MCV4O) 02/22/2017   • Meningococcal MCV4, Unspecified 02/22/2017, 10/12/2020   • Meningococcal MCV4P 02/22/2017, 10/12/2020   • TD (adult) Preservative Free 03/22/2017   • Td (adult), adsorbed 03/22/2017   • Tdap 02/22/2017, 11/04/2017   • Varicella 02/22/2017, 03/22/2017     Objective   /78 (BP Location: Left arm, Patient Position: Sitting, Cuff Size: Standard)   Pulse 84   Temp 97.7 °F (36.5 °C) (Tympanic)   Resp 16   Ht 5' 2\" (1.575 m)   Wt 77.2 kg (170 lb 3.2 oz)   LMP 01/10/2025 (Exact Date)   SpO2 98%   BMI 31.13 kg/m²     Physical Exam  Vitals and nursing note reviewed.   Constitutional:       General: She is not in acute distress.     Appearance: Normal appearance. She is well-developed. She is not ill-appearing.   HENT:      Head: Normocephalic and atraumatic.      Right Ear: Tympanic membrane, ear canal and external ear normal.      Left Ear: Tympanic membrane, ear canal and external ear normal.      Nose: Nose normal.      Mouth/Throat:      Mouth: Mucous membranes are moist.   Eyes:      Conjunctiva/sclera: Conjunctivae normal.      Pupils: Pupils are equal, round, and reactive to light.   Cardiovascular:      Rate and Rhythm: Normal rate and regular rhythm.      Heart sounds: Normal heart sounds.   Pulmonary:      Effort: Pulmonary effort is normal.      Breath sounds: Normal breath sounds.   Abdominal:      General: Bowel sounds are normal.      Palpations: " Abdomen is soft.   Musculoskeletal:         General: Normal range of motion.      Cervical back: Normal range of motion.   Lymphadenopathy:      Cervical: No cervical adenopathy.   Skin:     General: Skin is warm and dry.   Neurological:      Mental Status: She is alert and oriented to person, place, and time.   Psychiatric:         Mood and Affect: Mood normal.         Behavior: Behavior normal.

## 2025-01-17 NOTE — ASSESSMENT & PLAN NOTE
- Prescription sent for triamcinolone cream.   - Follow up if no improvement.   Orders:  •  triamcinolone (KENALOG) 0.5 % cream; Apply topically 2 (two) times a day as needed for rash

## 2025-01-17 NOTE — ASSESSMENT & PLAN NOTE
- Continue levothyroxine 137 mcg daily.  - Due for repeat TSH and free T4.   - Continue follow up with Endocrinology.   Orders:  •  TSH, 3rd generation with Free T4 reflex; Future

## 2025-01-27 ENCOUNTER — TELEPHONE (OUTPATIENT)
Dept: PSYCHIATRY | Facility: CLINIC | Age: 20
End: 2025-01-27

## 2025-01-27 NOTE — TELEPHONE ENCOUNTER
Provider contacted patient to discuss need for KARIME due to insurance change since Provider is an [employer]. Patient reports not being aware of any addition of new insurance and will check back prior to next appointment on 1/29/25. Provider notified patient if not sorted out prior to date, appointment will be cancelled and KARIME will be processed. Patient will call office back please assist with any needs upon return contact.

## 2025-02-16 PROBLEM — Z00.00 HEALTHCARE MAINTENANCE: Status: RESOLVED | Noted: 2025-01-17 | Resolved: 2025-02-16

## 2025-03-10 ENCOUNTER — DOCUMENTATION (OUTPATIENT)
Dept: PSYCHIATRY | Facility: CLINIC | Age: 20
End: 2025-03-10

## 2025-03-10 DIAGNOSIS — F41.9 ANXIETY: ICD-10-CM

## 2025-03-10 DIAGNOSIS — F32.A DEPRESSION, UNSPECIFIED DEPRESSION TYPE: Primary | ICD-10-CM
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
## 2025-03-19 ENCOUNTER — OFFICE VISIT (OUTPATIENT)
Dept: FAMILY MEDICINE CLINIC | Facility: CLINIC | Age: 20
End: 2025-03-19
Payer: COMMERCIAL

## 2025-03-19 VITALS
RESPIRATION RATE: 16 BRPM | HEART RATE: 84 BPM | HEIGHT: 62 IN | WEIGHT: 162.2 LBS | OXYGEN SATURATION: 98 % | TEMPERATURE: 98.7 F | SYSTOLIC BLOOD PRESSURE: 110 MMHG | BODY MASS INDEX: 29.85 KG/M2 | DIASTOLIC BLOOD PRESSURE: 82 MMHG

## 2025-03-19 DIAGNOSIS — R19.7 DIARRHEA, UNSPECIFIED TYPE: Primary | ICD-10-CM

## 2025-03-19 DIAGNOSIS — E66.3 OVERWEIGHT WITH BODY MASS INDEX (BMI) OF 29 TO 29.9 IN ADULT: ICD-10-CM

## 2025-03-19 DIAGNOSIS — E03.9 ACQUIRED HYPOTHYROIDISM: ICD-10-CM

## 2025-03-19 PROCEDURE — 99214 OFFICE O/P EST MOD 30 MIN: CPT | Performed by: NURSE PRACTITIONER

## 2025-03-19 RX ORDER — LEVOTHYROXINE SODIUM 137 UG/1
137 TABLET ORAL DAILY
Qty: 90 TABLET | Refills: 0 | Status: SHIPPED | OUTPATIENT
Start: 2025-03-19

## 2025-03-19 NOTE — LETTER
March 19, 2025     Patient: Marquita Haynes  YOB: 2005  Date of Visit: 3/19/2025      To Whom it May Concern:    Marquita Haynes is under my professional care. Marquita was seen in my office on 3/19/2025. Marquita may return to school on 3/20/2025 .    If you have any questions or concerns, please don't hesitate to call.         Sincerely,          ADRIAN Beckford        CC: No Recipients

## 2025-03-19 NOTE — ASSESSMENT & PLAN NOTE
- Continue levothyroxine 137 mcg daily.  - Due for repeat TSH and free T4.   - Continue follow up with Endocrinology.   Orders:  •  levothyroxine 137 mcg tablet; Take 1 tablet (137 mcg total) by mouth daily

## 2025-03-19 NOTE — PROGRESS NOTES
Name: Marquita Haynes      : 2005      MRN: [id number]  Encounter Provider: ADRIAN Beckford  Encounter Date: 3/19/2025   Encounter department: [facility] [address] [facility]    Assessment & Plan  Diarrhea, unspecified type  - Recommend bland diet, increased oral hydration, and avoidance of dairy.   - Follow up if no improvement.        Acquired hypothyroidism  - Continue levothyroxine 137 mcg daily.  - Due for repeat TSH and free T4.   - Continue follow up with Endocrinology.   Orders:  •  levothyroxine 137 mcg tablet; Take 1 tablet (137 mcg total) by mouth daily    Overweight with body mass index (BMI) of 29 to 29.9 in adult  - She is down 8 pounds since last visit.  She is down a total of 33 pounds.  - Continue Wegovy 2.4 mg weekly. Discussed side effects.  - Encourage healthy diet and regular exercise.  - Will continue to monitor.               History of Present Illness     Patient presents to office today with complaints of stomach discomfort and diarrhea that started last night. States that she thinks it is related to her caffeine intake. States she drank 3 Celcius and a Red Bull in a short period of time. Normally doesn't drink this much. Caffeine has affected her this way in the past. She denies any fever, chills, nausea, or vomiting. She denies any other concerns or complaints today.           Review of Systems   Constitutional:  Negative for fatigue and fever.   HENT:  Negative for trouble swallowing.    Eyes:  Negative for visual disturbance.   Respiratory:  Negative for cough and shortness of breath.    Cardiovascular:  Negative for chest pain and palpitations.   Gastrointestinal:  Positive for abdominal pain and diarrhea. Negative for blood in stool.   Endocrine: Negative for cold intolerance and heat intolerance.   Genitourinary:  Negative for difficulty urinating and dysuria.   Musculoskeletal:  Negative for gait problem.   Skin:  Negative for rash.   Neurological:  Negative  for dizziness, syncope and headaches.   Hematological:  Negative for adenopathy.   Psychiatric/Behavioral:  Negative for behavioral problems.      Past Medical History:   Diagnosis Date   • Anemia    • BMI 35.0-35.9,adult    • Depression    • Hypothyroidism    • Polycystic ovary syndrome      Past Surgical History:   Procedure Laterality Date   • NO PAST SURGERIES       Family History   Problem Relation Age of Onset   • Diabetes Mother    • Diabetes Father    • Hypertension Maternal Grandmother    • Breast cancer Neg Hx    • Colon cancer Neg Hx    • Ovarian cancer Neg Hx    • Uterine cancer Neg Hx      Social History     Tobacco Use   • Smoking status: Never   • Smokeless tobacco: Never   Vaping Use   • Vaping status: Never Used   Substance and Sexual Activity   • Alcohol use: No   • Drug use: Never   • Sexual activity: Never     Current Outpatient Medications on File Prior to Visit   Medication Sig   • escitalopram (LEXAPRO) 20 mg tablet TAKE 1 TABLET BY MOUTH EVERY DAY   • hydrOXYzine pamoate (VISTARIL) 25 mg capsule TAKE 1 CAPSULE (25 MG TOTAL) BY MOUTH DAILY AT BEDTIME AS NEEDED (SLEEP)   • metFORMIN (GLUCOPHAGE-XR) 500 mg 24 hr tablet TAKE 1 TABLET BY MOUTH EVERY DAY   • norgestimate-ethinyl estradiol (ORTHO TRI-CYCLEN LO) 0.18/0.215/0.25 MG-25 MCG per tablet Take 1 tablet by mouth daily   • ondansetron (ZOFRAN) 4 mg tablet Take 1 tablet (4 mg total) by mouth every 8 (eight) hours as needed for nausea or vomiting   • Semaglutide-Weight Management (Wegovy) 2.4 MG/0.75ML Inject 0.75 mL (2.4 mg total) under the skin once a week Inject 2.4 mg under the skin weekly   • triamcinolone (KENALOG) 0.5 % cream Apply topically 2 (two) times a day as needed for rash   • [DISCONTINUED] levothyroxine 137 mcg tablet TAKE 1 TABLET BY MOUTH EVERY DAY   • ascorbic acid (VITAMIN C) 500 MG TBCR Take 1 tablet (500 mg total) by mouth daily (Patient not taking: Reported on 1/15/2025)   • ferrous sulfate 324 (65 Fe) mg Take 1 tablet  "(324 mg total) by mouth 2 (two) times a day before meals (Patient not taking: Reported on 1/15/2025)   • hydrOXYzine HCL (ATARAX) 10 mg tablet Take 1 tablet (10 mg total) by mouth every 6 (six) hours as needed for anxiety (Patient not taking: Reported on 3/19/2025)     Allergies   Allergen Reactions   • No Active Allergies      Immunization History   Administered Date(s) Administered   • COVID-19 PFIZER VACCINE 0.3 ML IM 08/26/2022   • COVID-19 Pfizer mRNA vacc PF mario-sucrose 12 yr and older (Comirnaty) 10/29/2024   • COVID-19 Pfizer vac (Mario-sucrose, gray cap) 12 yr+ IM 08/26/2022, 03/30/2023   • HPV 06/23/2017   • HPV9 03/22/2017, 06/23/2017, 11/01/2017   • Hep A, ped/adol, 2 dose 11/01/2017   • Hep B, Adolescent or Pediatric 02/22/2017, 03/22/2017, 06/23/2017   • Hepatitis A 03/22/2017, 11/01/2017   • INFLUENZA 10/12/2018, 01/20/2020   • IPV 02/22/2017, 03/22/2017, 11/01/2017, 01/21/2020   • Influenza, injectable, quadrivalent, preservative free 0.5 mL 01/20/2020, 10/18/2023   • Influenza, seasonal, injectable, preservative free 10/29/2024   • MMR 02/22/2017, 03/22/2017   • MMRV 03/22/2017   • Meningococcal Conjugate (MCV4O) 02/22/2017   • Meningococcal MCV4, Unspecified 02/22/2017, 10/12/2020   • Meningococcal MCV4P 02/22/2017, 10/12/2020   • TD (adult) Preservative Free 03/22/2017   • Td (adult), adsorbed 03/22/2017   • Tdap 02/22/2017, 11/04/2017   • Varicella 02/22/2017, 03/22/2017     Objective   /82 (BP Location: Left arm, Patient Position: Sitting, Cuff Size: Standard)   Pulse 84   Temp 98.7 °F (37.1 °C) (Tympanic)   Resp 16   Ht 5' 2\" (1.575 m)   Wt 73.6 kg (162 lb 3.2 oz)   SpO2 98%   BMI 29.67 kg/m²     Physical Exam  Vitals and nursing note reviewed.   Constitutional:       Appearance: Normal appearance. She is well-developed.   HENT:      Head: Normocephalic and atraumatic.      Right Ear: External ear normal.      Left Ear: External ear normal.   Eyes:      Conjunctiva/sclera: " Conjunctivae normal.   Cardiovascular:      Rate and Rhythm: Normal rate and regular rhythm.      Heart sounds: Normal heart sounds.   Pulmonary:      Effort: Pulmonary effort is normal.      Breath sounds: Normal breath sounds.   Abdominal:      General: Bowel sounds are normal.      Palpations: Abdomen is soft.      Tenderness: There is abdominal tenderness in the right upper quadrant and right lower quadrant. Negative signs include Danielle's sign.   Musculoskeletal:         General: Normal range of motion.      Cervical back: Normal range of motion.   Skin:     General: Skin is warm and dry.   Neurological:      Mental Status: She is alert and oriented to person, place, and time.   Psychiatric:         Mood and Affect: Mood normal.         Behavior: Behavior normal.

## 2025-03-19 NOTE — ASSESSMENT & PLAN NOTE
- She is down 8 pounds since last visit.  She is down a total of 33 pounds.  - Continue Wegovy 2.4 mg weekly. Discussed side effects.  - Encourage healthy diet and regular exercise.  - Will continue to monitor.

## 2025-03-19 NOTE — ASSESSMENT & PLAN NOTE
- Recommend bland diet, increased oral hydration, and avoidance of dairy.   - Follow up if no improvement.

## 2025-04-03 ENCOUNTER — APPOINTMENT (OUTPATIENT)
Dept: LAB | Facility: CLINIC | Age: 20
End: 2025-04-03
Payer: COMMERCIAL

## 2025-04-03 ENCOUNTER — OFFICE VISIT (OUTPATIENT)
Dept: ENDOCRINOLOGY | Facility: CLINIC | Age: 20
End: 2025-04-03
Payer: COMMERCIAL

## 2025-04-03 VITALS
BODY MASS INDEX: 30 KG/M2 | SYSTOLIC BLOOD PRESSURE: 110 MMHG | HEIGHT: 62 IN | HEART RATE: 69 BPM | OXYGEN SATURATION: 99 % | WEIGHT: 163 LBS | DIASTOLIC BLOOD PRESSURE: 80 MMHG

## 2025-04-03 DIAGNOSIS — E03.9 ACQUIRED HYPOTHYROIDISM: ICD-10-CM

## 2025-04-03 DIAGNOSIS — R73.9 HYPERGLYCEMIA: ICD-10-CM

## 2025-04-03 DIAGNOSIS — E03.9 ACQUIRED HYPOTHYROIDISM: Primary | ICD-10-CM

## 2025-04-03 DIAGNOSIS — D50.8 OTHER IRON DEFICIENCY ANEMIA: ICD-10-CM

## 2025-04-03 DIAGNOSIS — Z00.00 HEALTHCARE MAINTENANCE: ICD-10-CM

## 2025-04-03 DIAGNOSIS — E28.2 PCOS (POLYCYSTIC OVARIAN SYNDROME): ICD-10-CM

## 2025-04-03 LAB
ALBUMIN SERPL BCG-MCNC: 4.1 G/DL (ref 3.5–5)
ALP SERPL-CCNC: 87 U/L (ref 34–104)
ALT SERPL W P-5'-P-CCNC: 14 U/L (ref 7–52)
ANION GAP SERPL CALCULATED.3IONS-SCNC: 9 MMOL/L (ref 4–13)
AST SERPL W P-5'-P-CCNC: 16 U/L (ref 13–39)
BASOPHILS # BLD AUTO: 0.04 THOUSANDS/ÂΜL (ref 0–0.1)
BASOPHILS NFR BLD AUTO: 1 % (ref 0–1)
BILIRUB SERPL-MCNC: 0.29 MG/DL (ref 0.2–1)
BUN SERPL-MCNC: 8 MG/DL (ref 5–25)
CALCIUM SERPL-MCNC: 9.2 MG/DL (ref 8.4–10.2)
CHLORIDE SERPL-SCNC: 102 MMOL/L (ref 96–108)
CO2 SERPL-SCNC: 27 MMOL/L (ref 21–32)
CREAT SERPL-MCNC: 0.84 MG/DL (ref 0.6–1.3)
EOSINOPHIL # BLD AUTO: 0.13 THOUSAND/ÂΜL (ref 0–0.61)
EOSINOPHIL NFR BLD AUTO: 2 % (ref 0–6)
ERYTHROCYTE [DISTWIDTH] IN BLOOD BY AUTOMATED COUNT: 14.1 % (ref 11.6–15.1)
EST. AVERAGE GLUCOSE BLD GHB EST-MCNC: 108 MG/DL
FERRITIN SERPL-MCNC: 18 NG/ML (ref 11–307)
GFR SERPL CREATININE-BSD FRML MDRD: 99 ML/MIN/1.73SQ M
GLUCOSE SERPL-MCNC: 91 MG/DL (ref 65–140)
HBA1C MFR BLD: 5.4 %
HCT VFR BLD AUTO: 37.8 % (ref 34.8–46.1)
HGB BLD-MCNC: 11.9 G/DL (ref 11.5–15.4)
IMM GRANULOCYTES # BLD AUTO: 0.03 THOUSAND/UL (ref 0–0.2)
IMM GRANULOCYTES NFR BLD AUTO: 0 % (ref 0–2)
IRON SATN MFR SERPL: 8 % (ref 15–50)
IRON SERPL-MCNC: 31 UG/DL (ref 50–212)
LYMPHOCYTES # BLD AUTO: 2.57 THOUSANDS/ÂΜL (ref 0.6–4.47)
LYMPHOCYTES NFR BLD AUTO: 31 % (ref 14–44)
MCH RBC QN AUTO: 25.8 PG (ref 26.8–34.3)
MCHC RBC AUTO-ENTMCNC: 31.5 G/DL (ref 31.4–37.4)
MCV RBC AUTO: 82 FL (ref 82–98)
MONOCYTES # BLD AUTO: 0.65 THOUSAND/ÂΜL (ref 0.17–1.22)
MONOCYTES NFR BLD AUTO: 8 % (ref 4–12)
NEUTROPHILS # BLD AUTO: 4.76 THOUSANDS/ÂΜL (ref 1.85–7.62)
NEUTS SEG NFR BLD AUTO: 58 % (ref 43–75)
NRBC BLD AUTO-RTO: 0 /100 WBCS
PLATELET # BLD AUTO: 331 THOUSANDS/UL (ref 149–390)
PMV BLD AUTO: 10.4 FL (ref 8.9–12.7)
POTASSIUM SERPL-SCNC: 4.1 MMOL/L (ref 3.5–5.3)
PROT SERPL-MCNC: 7.6 G/DL (ref 6.4–8.4)
RBC # BLD AUTO: 4.62 MILLION/UL (ref 3.81–5.12)
SODIUM SERPL-SCNC: 138 MMOL/L (ref 135–147)
TIBC SERPL-MCNC: 397.6 UG/DL (ref 250–450)
TRANSFERRIN SERPL-MCNC: 284 MG/DL (ref 203–362)
TSH SERPL DL<=0.05 MIU/L-ACNC: 3.97 UIU/ML (ref 0.45–4.5)
UIBC SERPL-MCNC: 367 UG/DL (ref 155–355)
WBC # BLD AUTO: 8.18 THOUSAND/UL (ref 4.31–10.16)

## 2025-04-03 PROCEDURE — 83036 HEMOGLOBIN GLYCOSYLATED A1C: CPT

## 2025-04-03 PROCEDURE — 82728 ASSAY OF FERRITIN: CPT

## 2025-04-03 PROCEDURE — 36415 COLL VENOUS BLD VENIPUNCTURE: CPT

## 2025-04-03 PROCEDURE — 99214 OFFICE O/P EST MOD 30 MIN: CPT | Performed by: PHYSICIAN ASSISTANT

## 2025-04-03 PROCEDURE — 80053 COMPREHEN METABOLIC PANEL: CPT

## 2025-04-03 PROCEDURE — 85025 COMPLETE CBC W/AUTO DIFF WBC: CPT

## 2025-04-03 PROCEDURE — 84443 ASSAY THYROID STIM HORMONE: CPT

## 2025-04-03 PROCEDURE — 83540 ASSAY OF IRON: CPT

## 2025-04-03 PROCEDURE — 83550 IRON BINDING TEST: CPT

## 2025-04-03 NOTE — PROGRESS NOTES
Name: Marquita Haynes      : 2005      MRN: [id number]  Encounter Provider: Mari Hwang PA-C  Encounter Date: 4/3/2025   Encounter department: [facility] [place]    No chief complaint on file.  :  Assessment & Plan  Acquired hypothyroidism  TSH 2024: 0.31  Currently maintained on levothyroxine 137 mcg daily  Encouraged patient to complete labs to determine if dose adjustment is needed       PCOS (polycystic ovarian syndrome)  Maintained on Wegovy           History of Present Illness {?Quick Links Encounters * My Last Note * Last Note in Specialty * Snapshot * Since Last Visit * History :56206}  History of Present Illness  Marquita Haynes  presents for evaluation of hypothyroidism and weight management.    She reports overall good health with no specific concerns or complaints. She has not yet completed her thyroid function tests, which were due in 2025. She is currently on a daily regimen of levothyroxine 137 mcg, taken on an empty stomach with a full glass of water. The exact date of the dosage adjustment is uncertain, but it was likely implemented last year. She reports no recent weight fluctuations or changes in hair growth, attributing any potential variations to her use of Wegovy. She also reports no difficulty swallowing, respiratory issues, or alterations in her voice.    She is currently on Wegovy 2 mg for weight management. She reports no side effects such as nausea, vomiting, or diarrhea.      Pertinent Medical History   Patient has a past medical history of hypothyroidism diagnosed in 2018 as well as a history of PCOS.         Review of Systems as per HPI       Medical History Reviewed by provider this encounter:  Tobacco  Allergies  Meds  Problems  Med Hx  Surg Hx  Fam Hx     .    Objective {?Quick Links Trend Vitals * Enter New Vitals * Results Review * Timeline (Adult) * Labs * Imaging * Cardiology * Procedures * Lung Cancer  "Screening * Surgical eConsent :16151}  /80   Pulse 69   Ht 5' 2\" (1.575 m)   Wt 73.9 kg (163 lb)   SpO2 99%   BMI 29.81 kg/m²      Body mass index is 29.81 kg/m².  Wt Readings from Last 3 Encounters:   04/03/25 73.9 kg (163 lb)   03/19/25 73.6 kg (162 lb 3.2 oz)   01/17/25 77.2 kg (170 lb 3.2 oz)     Physical Exam  Vitals and nursing note reviewed.   Constitutional:       General: She is not in acute distress.     Appearance: She is well-developed.   HENT:      Head: Normocephalic and atraumatic.   Eyes:      General: No scleral icterus.     Conjunctiva/sclera: Conjunctivae normal.   Pulmonary:      Effort: Pulmonary effort is normal.   Neurological:      Mental Status: She is alert.   Psychiatric:         Attention and Perception: Attention normal.       Physical Exam      Results    Labs:   Lab Results   Component Value Date    HGBA1C 5.7 (H) 06/28/2024     Lab Results   Component Value Date    CREATININE 0.74 06/28/2024    CREATININE 0.86 04/01/2023    CREATININE 1.03 08/15/2022    BUN 10 06/28/2024     04/05/2018    K 4.2 06/28/2024     06/28/2024    CO2 22 06/28/2024     eGFR   Date Value Ref Range Status   06/28/2024 116 ml/min/1.73sq m Final     Lab Results   Component Value Date    HDL 44 (L) 04/01/2023    TRIG 122 04/01/2023     Lab Results   Component Value Date    ALT 15 06/28/2024    AST 22 06/28/2024    ALKPHOS 73 06/28/2024    BILITOT 0.2 04/05/2018     Lab Results   Component Value Date    VMM0IUCZUCXC 0.031 (L) 06/28/2024    OIL3FYQEICQU 5.963 (H) 04/13/2024    SDN2HVQXTQCF 6.956 (H) 01/24/2024     Lab Results   Component Value Date    FREET4 1.28 (H) 06/28/2024       Patient Instructions   Ensure you are taking your thyroid medication at least one hour prior to meals, on an empty stomach and 4 hours before any vitamins/supplements  Obtain labs prior to follow-up appointment  Hold all biotin-containing supplements for three days prior to thyroid lab draws      Discussed with " the patient and all questioned fully answered. She will call me if any problems arise.

## 2025-04-03 NOTE — ASSESSMENT & PLAN NOTE
TSH 6/28/2024: 0.31  Currently maintained on levothyroxine 137 mcg daily  Encouraged patient to complete labs to determine if dose adjustment is needed

## 2025-04-09 ENCOUNTER — RA CDI HCC (OUTPATIENT)
Dept: OTHER | Facility: HOSPITAL | Age: 20
End: 2025-04-09

## 2025-04-09 NOTE — PROGRESS NOTES
HCC coding opportunities          Chart Reviewed number of suggestions sent to Provider: 1   Depression: found on active problem list, Can Depression be further classified as per ICD 10 coding guidelines.        Patients Insurance        Commercial Insurance: [employer]

## 2025-04-11 ENCOUNTER — RESULTS FOLLOW-UP (OUTPATIENT)
Dept: FAMILY MEDICINE CLINIC | Facility: CLINIC | Age: 20
End: 2025-04-11

## 2025-05-08 ENCOUNTER — OFFICE VISIT (OUTPATIENT)
Dept: FAMILY MEDICINE CLINIC | Facility: CLINIC | Age: 20
End: 2025-05-08
Payer: COMMERCIAL

## 2025-05-08 VITALS
BODY MASS INDEX: 31.91 KG/M2 | HEIGHT: 62 IN | RESPIRATION RATE: 16 BRPM | DIASTOLIC BLOOD PRESSURE: 72 MMHG | HEART RATE: 82 BPM | TEMPERATURE: 96.9 F | OXYGEN SATURATION: 98 % | WEIGHT: 173.4 LBS | SYSTOLIC BLOOD PRESSURE: 108 MMHG

## 2025-05-08 DIAGNOSIS — F41.9 ANXIETY: ICD-10-CM

## 2025-05-08 DIAGNOSIS — E66.811 CLASS 1 OBESITY DUE TO EXCESS CALORIES WITH SERIOUS COMORBIDITY AND BODY MASS INDEX (BMI) OF 31.0 TO 31.9 IN ADULT: Primary | ICD-10-CM

## 2025-05-08 DIAGNOSIS — E66.09 CLASS 1 OBESITY DUE TO EXCESS CALORIES WITH SERIOUS COMORBIDITY AND BODY MASS INDEX (BMI) OF 31.0 TO 31.9 IN ADULT: Primary | ICD-10-CM

## 2025-05-08 DIAGNOSIS — F32.A DEPRESSION, UNSPECIFIED DEPRESSION TYPE: ICD-10-CM

## 2025-05-08 DIAGNOSIS — E03.9 ACQUIRED HYPOTHYROIDISM: ICD-10-CM

## 2025-05-08 PROCEDURE — 99214 OFFICE O/P EST MOD 30 MIN: CPT | Performed by: NURSE PRACTITIONER

## 2025-05-08 RX ORDER — SEMAGLUTIDE 2.4 MG/.75ML
2.4 INJECTION, SOLUTION SUBCUTANEOUS WEEKLY
Qty: 9 ML | Refills: 0 | Status: SHIPPED | OUTPATIENT
Start: 2025-05-08 | End: 2025-05-08 | Stop reason: SDUPTHER

## 2025-05-08 RX ORDER — SEMAGLUTIDE 2.4 MG/.75ML
2.4 INJECTION, SOLUTION SUBCUTANEOUS WEEKLY
COMMUNITY
Start: 2025-05-07 | End: 2025-05-08 | Stop reason: SDUPTHER

## 2025-05-08 RX ORDER — SEMAGLUTIDE 2.4 MG/.75ML
2.4 INJECTION, SOLUTION SUBCUTANEOUS WEEKLY
Qty: 9 ML | Refills: 0 | Status: SHIPPED | OUTPATIENT
Start: 2025-05-08

## 2025-05-08 NOTE — PROGRESS NOTES
Name: Marquita Haynes      : 2005      MRN: [id number]  Encounter Provider: ADRIAN Beckford  Encounter Date: 2025   Encounter department: [facility] [address] [facility]  :  Assessment & Plan  Class 1 obesity due to excess calories with serious comorbidity and body mass index (BMI) of 31.0 to 31.9 in adult  - She is up 10 pounds since last visit. States she missed doses of medication.   - Continue Wegovy 2.4 mg weekly. Discussed side effects.  - Encourage healthy diet and regular exercise.  - Recommend follow up in 3 months.   Orders:  •  Wegovy 2.4 MG/0.75ML; Inject 0.75 mL (2.4 mg total) under the skin once a week    Acquired hypothyroidism  - TSH therapeutic.   - Continue levothyroxine 137 mg daily.   - Will continue to monitor TSH and free T4.       Depression, unspecified depression type  - Stable on Lexapro to 20 mg daily. Continue same.  - Will continue to monitor.          Anxiety  - Stable on Lexapro to 20 mg daily. Continue same.  - Will continue to monitor.                 History of Present Illness   Patient with PMH of hypothyroidism, PCOS, anxiety, and depression presents to office today for follow up. Reports he hasn't been taking her medications consistently for the past few weeks due to being busy studying for finals. She was taking Wegovy for weight loss. She is up 10 pounds since her last visit. Her recent labs are all stable. She denies any concerns or complaints today.          Review of Systems   Constitutional:  Negative for fatigue and fever.   HENT:  Negative for trouble swallowing.    Eyes:  Negative for visual disturbance.   Respiratory:  Negative for cough and shortness of breath.    Cardiovascular:  Negative for chest pain and palpitations.   Gastrointestinal:  Negative for abdominal pain and blood in stool.   Endocrine: Negative for cold intolerance and heat intolerance.   Genitourinary:  Negative for difficulty urinating and dysuria.   Musculoskeletal:   "Negative for gait problem.   Skin:  Negative for rash.   Neurological:  Negative for dizziness, syncope and headaches.   Hematological:  Negative for adenopathy.   Psychiatric/Behavioral:  Negative for behavioral problems.        Objective   /72 (BP Location: Left arm, Patient Position: Sitting, Cuff Size: Standard)   Pulse 82   Temp (!) 96.9 °F (36.1 °C) (Tympanic)   Resp 16   Ht 5' 2\" (1.575 m)   Wt 78.7 kg (173 lb 6.4 oz)   SpO2 98%   BMI 31.72 kg/m²      Physical Exam  Vitals and nursing note reviewed.   Constitutional:       Appearance: Normal appearance. She is well-developed.   HENT:      Head: Normocephalic and atraumatic.      Right Ear: External ear normal.      Left Ear: External ear normal.   Eyes:      Conjunctiva/sclera: Conjunctivae normal.   Cardiovascular:      Rate and Rhythm: Normal rate and regular rhythm.      Heart sounds: Normal heart sounds.   Pulmonary:      Effort: Pulmonary effort is normal.      Breath sounds: Normal breath sounds.   Musculoskeletal:         General: Normal range of motion.      Cervical back: Normal range of motion.   Skin:     General: Skin is warm and dry.   Neurological:      Mental Status: She is alert and oriented to person, place, and time.   Psychiatric:         Mood and Affect: Mood normal.         Behavior: Behavior normal.         "

## 2025-05-09 NOTE — ASSESSMENT & PLAN NOTE
- She is up 10 pounds since last visit. States she missed doses of medication.   - Continue Wegovy 2.4 mg weekly. Discussed side effects.  - Encourage healthy diet and regular exercise.  - Recommend follow up in 3 months.   Orders:  •  Wegovy 2.4 MG/0.75ML; Inject 0.75 mL (2.4 mg total) under the skin once a week

## 2025-05-09 NOTE — ASSESSMENT & PLAN NOTE
- TSH therapeutic.   - Continue levothyroxine 137 mg daily.   - Will continue to monitor TSH and free T4.

## 2025-06-07 DIAGNOSIS — E28.2 PCOS (POLYCYSTIC OVARIAN SYNDROME): ICD-10-CM

## 2025-06-09 RX ORDER — METFORMIN HYDROCHLORIDE 500 MG/1
500 TABLET, EXTENDED RELEASE ORAL DAILY
Qty: 90 TABLET | Refills: 1 | Status: SHIPPED | OUTPATIENT
Start: 2025-06-09

## 2025-07-24 ENCOUNTER — TELEPHONE (OUTPATIENT)
Age: 20
End: 2025-07-24

## 2025-07-25 NOTE — TELEPHONE ENCOUNTER
Patient called to reschedule her 3 month follow-up appointment that was for 8/6/25 to the soonest possible appointment. Patient scheduled with Mayela on Monday 7/28/25. Patient is asking if she needs to get a Hepatitis B vaccine/booster and if so, could she get it at this appointment on Monday. Patient is also requesting a tuberculosis vaccine. I asked if she needed a skin test or a vaccine, and patient states she needs the vaccine. Patient states her school is requiring these vaccines before 8/1/25. Please contact patient when possible to further assist, [phone]. Thank you!

## 2025-07-28 ENCOUNTER — OFFICE VISIT (OUTPATIENT)
Dept: FAMILY MEDICINE CLINIC | Facility: CLINIC | Age: 20
End: 2025-07-28
Payer: COMMERCIAL

## 2025-07-28 ENCOUNTER — APPOINTMENT (OUTPATIENT)
Dept: LAB | Age: 20
End: 2025-07-28
Payer: COMMERCIAL

## 2025-07-28 VITALS
HEIGHT: 62 IN | RESPIRATION RATE: 16 BRPM | DIASTOLIC BLOOD PRESSURE: 70 MMHG | OXYGEN SATURATION: 99 % | BODY MASS INDEX: 31.94 KG/M2 | SYSTOLIC BLOOD PRESSURE: 128 MMHG | WEIGHT: 173.6 LBS | TEMPERATURE: 98 F | HEART RATE: 106 BPM

## 2025-07-28 DIAGNOSIS — Z13.0 SCREENING FOR BLOOD DISEASE: ICD-10-CM

## 2025-07-28 DIAGNOSIS — F32.A DEPRESSION, UNSPECIFIED DEPRESSION TYPE: Primary | ICD-10-CM

## 2025-07-28 DIAGNOSIS — E03.9 ACQUIRED HYPOTHYROIDISM: ICD-10-CM

## 2025-07-28 DIAGNOSIS — E66.09 CLASS 1 OBESITY DUE TO EXCESS CALORIES WITH SERIOUS COMORBIDITY AND BODY MASS INDEX (BMI) OF 31.0 TO 31.9 IN ADULT: ICD-10-CM

## 2025-07-28 DIAGNOSIS — E66.811 CLASS 1 OBESITY DUE TO EXCESS CALORIES WITH SERIOUS COMORBIDITY AND BODY MASS INDEX (BMI) OF 31.0 TO 31.9 IN ADULT: ICD-10-CM

## 2025-07-28 LAB
CHOLEST SERPL-MCNC: 183 MG/DL (ref ?–200)
HBV SURFACE AG SER QL: NORMAL
HDLC SERPL-MCNC: 53 MG/DL
LDLC SERPL CALC-MCNC: 76 MG/DL (ref 0–100)
TRIGL SERPL-MCNC: 271 MG/DL (ref ?–150)

## 2025-07-28 PROCEDURE — 87340 HEPATITIS B SURFACE AG IA: CPT

## 2025-07-28 PROCEDURE — 99214 OFFICE O/P EST MOD 30 MIN: CPT

## 2025-07-28 PROCEDURE — 86706 HEP B SURFACE ANTIBODY: CPT

## 2025-07-28 PROCEDURE — 86735 MUMPS ANTIBODY: CPT

## 2025-07-28 PROCEDURE — 86762 RUBELLA ANTIBODY: CPT

## 2025-07-28 PROCEDURE — 86480 TB TEST CELL IMMUN MEASURE: CPT

## 2025-07-28 PROCEDURE — 86765 RUBEOLA ANTIBODY: CPT

## 2025-07-28 PROCEDURE — 86803 HEPATITIS C AB TEST: CPT

## 2025-07-28 RX ORDER — SEMAGLUTIDE 2.4 MG/.75ML
2.4 INJECTION, SOLUTION SUBCUTANEOUS WEEKLY
Qty: 9 ML | Refills: 0 | Status: SHIPPED | OUTPATIENT
Start: 2025-07-28 | End: 2025-10-20

## 2025-07-29 LAB
GAMMA INTERFERON BACKGROUND BLD IA-ACNC: 0.05 IU/ML
HBV SURFACE AB SER-ACNC: >500 MIU/ML
HCV AB SER QL: NORMAL
M TB IFN-G BLD-IMP: NEGATIVE
M TB IFN-G CD4+ BCKGRND COR BLD-ACNC: 0.04 IU/ML
M TB IFN-G CD4+ BCKGRND COR BLD-ACNC: 0.04 IU/ML
MITOGEN IGNF BCKGRD COR BLD-ACNC: 9.95 IU/ML

## 2025-07-30 ENCOUNTER — PATIENT MESSAGE (OUTPATIENT)
Age: 20
End: 2025-07-30

## 2025-07-30 ENCOUNTER — RESULTS FOLLOW-UP (OUTPATIENT)
Dept: FAMILY MEDICINE CLINIC | Facility: CLINIC | Age: 20
End: 2025-07-30

## 2025-07-30 LAB
MEV IGG SER IA-ACNC: 281 AU/ML
MUV IGG SER IA-ACNC: 261 AU/ML
RUBV IGG SERPL IA-ACNC: 18.9 INDEX

## 2025-08-20 ENCOUNTER — TELEPHONE (OUTPATIENT)
Age: 20
End: 2025-08-20